# Patient Record
Sex: MALE | Race: BLACK OR AFRICAN AMERICAN | Employment: OTHER | URBAN - METROPOLITAN AREA
[De-identification: names, ages, dates, MRNs, and addresses within clinical notes are randomized per-mention and may not be internally consistent; named-entity substitution may affect disease eponyms.]

---

## 2019-04-03 ENCOUNTER — HOSPITAL ENCOUNTER (OUTPATIENT)
Age: 24
Setting detail: SPECIMEN
Discharge: HOME | End: 2019-04-03
Payer: MEDICARE

## 2019-04-03 LAB
HAV IGM SER IA-ACNC: NONREACTIVE
HEPATITIS B CORE IGM ANTIBODY: NONREACTIVE
HEPATITIS B SURFACE ANTIGEN: NONREACTIVE
HEPATITIS C ANTIBODY: NONREACTIVE
HIV AG/AB: NONREACTIVE
T. PALLIDUM, IGG: NONREACTIVE

## 2019-04-04 LAB
C. TRACHOMATIS DNA ,URINE: NEGATIVE
N. GONORRHOEAE DNA, URINE: NEGATIVE
SPECIMEN DESCRIPTION: NORMAL

## 2019-04-10 LAB
SEND OUT REPORT: NORMAL
TEST NAME: NORMAL

## 2019-11-19 ENCOUNTER — HOSPITAL ENCOUNTER (EMERGENCY)
Age: 24
Discharge: HOME | End: 2019-11-19
Payer: MEDICARE

## 2019-11-19 VITALS
SYSTOLIC BLOOD PRESSURE: 141 MMHG | TEMPERATURE: 98.3 F | DIASTOLIC BLOOD PRESSURE: 92 MMHG | RESPIRATION RATE: 18 BRPM | OXYGEN SATURATION: 100 % | HEART RATE: 89 BPM

## 2019-11-19 DIAGNOSIS — Z20.2 STD EXPOSURE: Primary | ICD-10-CM

## 2019-11-19 LAB
BILIRUBIN URINE: NEGATIVE
BLOOD, URINE: NEGATIVE
CHARACTER, URINE: CLEAR
CHLAMYDIA TRACHOMATIS BY RT-PCR: DETECTED
COLOR: YELLOW
CT/NG SOURCE: ABNORMAL
GLUCOSE URINE: NEGATIVE MG/DL
KETONES, URINE: ABNORMAL
LEUKOCYTE ESTERASE, URINE: NEGATIVE
NEISSERIA GONORRHOEAE BY RT-PCR: NOT DETECTED
NITRITE, URINE: NEGATIVE
PH UA: 7 (ref 5–9)
PROTEIN UA: NEGATIVE
SPECIFIC GRAVITY, URINE: 1.03 (ref 1–1.03)
TRICHOMONAS, URINE, MALE: NORMAL
UROBILINOGEN, URINE: 1 EU/DL (ref 0–1)

## 2019-11-19 PROCEDURE — 96372 THER/PROPH/DIAG INJ SC/IM: CPT

## 2019-11-19 PROCEDURE — 81003 URINALYSIS AUTO W/O SCOPE: CPT

## 2019-11-19 PROCEDURE — 99283 EMERGENCY DEPT VISIT LOW MDM: CPT

## 2019-11-19 PROCEDURE — 87591 N.GONORRHOEAE DNA AMP PROB: CPT

## 2019-11-19 PROCEDURE — 2500000003 HC RX 250 WO HCPCS: Performed by: PHYSICIAN ASSISTANT

## 2019-11-19 PROCEDURE — 6360000002 HC RX W HCPCS: Performed by: PHYSICIAN ASSISTANT

## 2019-11-19 PROCEDURE — 6370000000 HC RX 637 (ALT 250 FOR IP): Performed by: PHYSICIAN ASSISTANT

## 2019-11-19 PROCEDURE — 87491 CHLMYD TRACH DNA AMP PROBE: CPT

## 2019-11-19 PROCEDURE — 87210 SMEAR WET MOUNT SALINE/INK: CPT

## 2019-11-19 RX ORDER — METRONIDAZOLE 500 MG/1
2000 TABLET ORAL ONCE
Status: COMPLETED | OUTPATIENT
Start: 2019-11-19 | End: 2019-11-19

## 2019-11-19 RX ORDER — AZITHROMYCIN 250 MG/1
1000 TABLET, FILM COATED ORAL ONCE
Status: COMPLETED | OUTPATIENT
Start: 2019-11-19 | End: 2019-11-19

## 2019-11-19 RX ADMIN — LIDOCAINE HYDROCHLORIDE 250 MG: 10 INJECTION, SOLUTION EPIDURAL; INFILTRATION; INTRACAUDAL; PERINEURAL at 15:18

## 2019-11-19 RX ADMIN — METRONIDAZOLE 2000 MG: 500 TABLET ORAL at 15:18

## 2019-11-19 RX ADMIN — AZITHROMYCIN 1000 MG: 250 TABLET, FILM COATED ORAL at 15:18

## 2019-11-19 ASSESSMENT — ENCOUNTER SYMPTOMS
ABDOMINAL PAIN: 0
SHORTNESS OF BREATH: 0
NAUSEA: 0
COLOR CHANGE: 0
VOMITING: 0
BACK PAIN: 0

## 2021-07-31 ENCOUNTER — HOSPITAL ENCOUNTER (EMERGENCY)
Age: 26
Discharge: HOME | End: 2021-07-31
Attending: EMERGENCY MEDICINE
Payer: MEDICARE

## 2021-07-31 ENCOUNTER — APPOINTMENT (OUTPATIENT)
Dept: ULTRASOUND IMAGING | Age: 26
End: 2021-07-31
Payer: MEDICARE

## 2021-07-31 VITALS
DIASTOLIC BLOOD PRESSURE: 85 MMHG | OXYGEN SATURATION: 96 % | SYSTOLIC BLOOD PRESSURE: 136 MMHG | HEART RATE: 82 BPM | RESPIRATION RATE: 16 BRPM | TEMPERATURE: 98 F

## 2021-07-31 DIAGNOSIS — N45.1 EPIDIDYMITIS: Primary | ICD-10-CM

## 2021-07-31 DIAGNOSIS — N39.0 URINARY TRACT INFECTION IN MALE: ICD-10-CM

## 2021-07-31 LAB
ANION GAP SERPL CALCULATED.3IONS-SCNC: 12 MEQ/L (ref 8–16)
BACTERIA: ABNORMAL /HPF
BASOPHILS # BLD: 0.7 %
BASOPHILS ABSOLUTE: 0.1 THOU/MM3 (ref 0–0.1)
BILIRUBIN URINE: NEGATIVE
BLOOD, URINE: ABNORMAL
BUN BLDV-MCNC: 14 MG/DL (ref 7–22)
CALCIUM SERPL-MCNC: 9.4 MG/DL (ref 8.5–10.5)
CASTS 2: ABNORMAL /LPF
CASTS UA: ABNORMAL /LPF
CHARACTER, URINE: CLEAR
CHLAMYDIA TRACHOMATIS BY RT-PCR: DETECTED
CHLORIDE BLD-SCNC: 106 MEQ/L (ref 98–111)
CO2: 23 MEQ/L (ref 23–33)
COLOR: YELLOW
CREAT SERPL-MCNC: 0.9 MG/DL (ref 0.4–1.2)
CRYSTALS, UA: ABNORMAL
CT/NG SOURCE: ABNORMAL
EOSINOPHIL # BLD: 2.6 %
EOSINOPHILS ABSOLUTE: 0.2 THOU/MM3 (ref 0–0.4)
EPITHELIAL CELLS, UA: ABNORMAL /HPF
ERYTHROCYTE [DISTWIDTH] IN BLOOD BY AUTOMATED COUNT: 13.3 % (ref 11.5–14.5)
ERYTHROCYTE [DISTWIDTH] IN BLOOD BY AUTOMATED COUNT: 39.5 FL (ref 35–45)
GFR SERPL CREATININE-BSD FRML MDRD: > 90 ML/MIN/1.73M2
GLUCOSE BLD-MCNC: 110 MG/DL (ref 70–108)
GLUCOSE URINE: NEGATIVE MG/DL
HCT VFR BLD CALC: 43.7 % (ref 42–52)
HEMOGLOBIN: 15.5 GM/DL (ref 14–18)
IMMATURE GRANS (ABS): 0.07 THOU/MM3 (ref 0–0.07)
IMMATURE GRANULOCYTES: 0.8 %
KETONES, URINE: ABNORMAL
LEUKOCYTE ESTERASE, URINE: ABNORMAL
LYMPHOCYTES # BLD: 49.9 %
LYMPHOCYTES ABSOLUTE: 4.2 THOU/MM3 (ref 1–4.8)
MCH RBC QN AUTO: 29.4 PG (ref 26–33)
MCHC RBC AUTO-ENTMCNC: 35.5 GM/DL (ref 32.2–35.5)
MCV RBC AUTO: 82.8 FL (ref 80–94)
MISCELLANEOUS 2: ABNORMAL
MONOCYTES # BLD: 6.5 %
MONOCYTES ABSOLUTE: 0.6 THOU/MM3 (ref 0.4–1.3)
NEISSERIA GONORRHOEAE BY RT-PCR: NOT DETECTED
NITRITE, URINE: NEGATIVE
NUCLEATED RED BLOOD CELLS: 0 /100 WBC
OSMOLALITY CALCULATION: 282.4 MOSMOL/KG (ref 275–300)
PH UA: 6 (ref 5–9)
PLATELET # BLD: 317 THOU/MM3 (ref 130–400)
PMV BLD AUTO: 8.7 FL (ref 9.4–12.4)
POTASSIUM SERPL-SCNC: 4.1 MEQ/L (ref 3.5–5.2)
PROTEIN UA: NEGATIVE
RBC # BLD: 5.28 MILL/MM3 (ref 4.7–6.1)
RBC URINE: ABNORMAL /HPF
RENAL EPITHELIAL, UA: ABNORMAL
SEG NEUTROPHILS: 39.5 %
SEGMENTED NEUTROPHILS ABSOLUTE COUNT: 3.4 THOU/MM3 (ref 1.8–7.7)
SODIUM BLD-SCNC: 141 MEQ/L (ref 135–145)
SPECIFIC GRAVITY, URINE: 1.03 (ref 1–1.03)
UROBILINOGEN, URINE: 2 EU/DL (ref 0–1)
WBC # BLD: 8.5 THOU/MM3 (ref 4.8–10.8)
WBC UA: > 100 /HPF
YEAST: ABNORMAL

## 2021-07-31 PROCEDURE — 81001 URINALYSIS AUTO W/SCOPE: CPT

## 2021-07-31 PROCEDURE — 87591 N.GONORRHOEAE DNA AMP PROB: CPT

## 2021-07-31 PROCEDURE — 36415 COLL VENOUS BLD VENIPUNCTURE: CPT

## 2021-07-31 PROCEDURE — 6360000002 HC RX W HCPCS: Performed by: EMERGENCY MEDICINE

## 2021-07-31 PROCEDURE — 2500000003 HC RX 250 WO HCPCS

## 2021-07-31 PROCEDURE — 85025 COMPLETE CBC W/AUTO DIFF WBC: CPT

## 2021-07-31 PROCEDURE — 76870 US EXAM SCROTUM: CPT

## 2021-07-31 PROCEDURE — 96372 THER/PROPH/DIAG INJ SC/IM: CPT

## 2021-07-31 PROCEDURE — 87491 CHLMYD TRACH DNA AMP PROBE: CPT

## 2021-07-31 PROCEDURE — 80048 BASIC METABOLIC PNL TOTAL CA: CPT

## 2021-07-31 PROCEDURE — 87086 URINE CULTURE/COLONY COUNT: CPT

## 2021-07-31 PROCEDURE — 6370000000 HC RX 637 (ALT 250 FOR IP): Performed by: EMERGENCY MEDICINE

## 2021-07-31 PROCEDURE — 99283 EMERGENCY DEPT VISIT LOW MDM: CPT

## 2021-07-31 RX ORDER — DOXYCYCLINE HYCLATE 100 MG/1
100 CAPSULE ORAL 2 TIMES DAILY
Qty: 20 CAPSULE | Refills: 0 | Status: SHIPPED | OUTPATIENT
Start: 2021-07-31 | End: 2021-08-10

## 2021-07-31 RX ORDER — AZITHROMYCIN 250 MG/1
1000 TABLET, FILM COATED ORAL ONCE
Status: COMPLETED | OUTPATIENT
Start: 2021-07-31 | End: 2021-07-31

## 2021-07-31 RX ORDER — LIDOCAINE HYDROCHLORIDE 10 MG/ML
INJECTION, SOLUTION EPIDURAL; INFILTRATION; INTRACAUDAL; PERINEURAL
Status: COMPLETED
Start: 2021-07-31 | End: 2021-07-31

## 2021-07-31 RX ORDER — CEFTRIAXONE 500 MG/1
500 INJECTION, POWDER, FOR SOLUTION INTRAMUSCULAR; INTRAVENOUS ONCE
Status: COMPLETED | OUTPATIENT
Start: 2021-07-31 | End: 2021-07-31

## 2021-07-31 RX ORDER — CIPROFLOXACIN 500 MG/1
500 TABLET, FILM COATED ORAL 2 TIMES DAILY
Qty: 20 TABLET | Refills: 0 | Status: SHIPPED | OUTPATIENT
Start: 2021-07-31 | End: 2021-08-10

## 2021-07-31 RX ADMIN — LIDOCAINE HYDROCHLORIDE 1 ML: 10 INJECTION, SOLUTION EPIDURAL; INFILTRATION; INTRACAUDAL; PERINEURAL at 02:51

## 2021-07-31 RX ADMIN — CEFTRIAXONE SODIUM 500 MG: 500 INJECTION, POWDER, FOR SOLUTION INTRAMUSCULAR; INTRAVENOUS at 02:49

## 2021-07-31 RX ADMIN — AZITHROMYCIN MONOHYDRATE 1000 MG: 250 TABLET ORAL at 02:47

## 2021-07-31 ASSESSMENT — ENCOUNTER SYMPTOMS
WHEEZING: 0
STRIDOR: 0
CHEST TIGHTNESS: 0
ABDOMINAL PAIN: 0
COUGH: 0
ABDOMINAL DISTENTION: 0
EYE DISCHARGE: 0
SHORTNESS OF BREATH: 0
EYE ITCHING: 0
DIARRHEA: 0
NAUSEA: 0
VOMITING: 0
EYE REDNESS: 0
EYE PAIN: 0
PHOTOPHOBIA: 0
BACK PAIN: 0
RHINORRHEA: 0
SORE THROAT: 0
CONSTIPATION: 0

## 2021-07-31 ASSESSMENT — PAIN DESCRIPTION - ORIENTATION: ORIENTATION: LEFT

## 2021-07-31 ASSESSMENT — PAIN DESCRIPTION - LOCATION: LOCATION: SCROTUM

## 2021-07-31 ASSESSMENT — PAIN DESCRIPTION - PAIN TYPE: TYPE: ACUTE PAIN

## 2021-07-31 ASSESSMENT — PAIN DESCRIPTION - DESCRIPTORS: DESCRIPTORS: THROBBING

## 2021-07-31 ASSESSMENT — PAIN SCALES - GENERAL: PAINLEVEL_OUTOF10: 6

## 2021-07-31 NOTE — ED PROVIDER NOTES
[address] ENCOUNTER      PATIENT NAME: Kierra Baez  MRN: [id number]  : 1995  MILLS: 2021  PROVIDER: Miguel A Martin MD      CHIEF COMPLAINT       Chief Complaint   Patient presents with    Scrotal Pain       Patient is seen and evaluated in a timely fashion. Nurses Notes are reviewed and I agree except as noted in the HPI. HISTORY OF PRESENT ILLNESS    Kierra Baez is a 22 y.o. male who presents to Emergency Department with Scrotal Pain     Patient presents to ED with left testicle pain and dysuria over last 2 days duration. No fever, no chills. Sexual partner was treated in ED recently for possible STI. Pain is at 3/10, worse on touch. History is obtained from patient. REVIEW OF SYSTEMS   Review of Systems   Constitutional: Negative for activity change, appetite change, chills, fatigue, fever and unexpected weight change. HENT: Negative for congestion, ear discharge, ear pain, hearing loss, nosebleeds, rhinorrhea and sore throat. Eyes: Negative for photophobia, pain, discharge, redness and itching. Respiratory: Negative for cough, chest tightness, shortness of breath, wheezing and stridor. Cardiovascular: Negative for chest pain, palpitations and leg swelling. Gastrointestinal: Negative for abdominal distention, abdominal pain, constipation, diarrhea, nausea and vomiting. Endocrine: Negative for cold intolerance, heat intolerance, polydipsia and polyphagia. Genitourinary: Positive for dysuria and testicular pain. Negative for flank pain, frequency and hematuria. Musculoskeletal: Negative for arthralgias, back pain, gait problem, myalgias, neck pain and neck stiffness. Skin: Negative for pallor, rash and wound. Allergic/Immunologic: Negative for environmental allergies and food allergies. Neurological: Negative for dizziness, tremors, syncope, weakness and headaches.    Psychiatric/Behavioral: Negative for agitation, behavioral problems, confusion, self-injury, sleep disturbance and suicidal ideas. PAST MEDICAL HISTORY   History reviewed. No pertinent past medical history. SURGICAL HISTORY     History reviewed. No pertinent surgical history. CURRENT MEDICATIONS       Previous Medications    No medications on file       ALLERGIES     Patient has no known allergies. FAMILY HISTORY     has no family status information on file. family history is not on file. SOCIAL HISTORY          PHYSICAL EXAM      oral temperature is 98 °F (36.7 °C). His blood pressure is 136/85 and his pulse is 82. His respiration is 16 and oxygen saturation is 96%. Physical Exam  Vitals and nursing note reviewed. Constitutional:       Appearance: He is well-developed. He is not diaphoretic. HENT:      Head: Normocephalic and atraumatic. Nose: Nose normal.   Eyes:      General: No scleral icterus. Right eye: No discharge. Left eye: No discharge. Conjunctiva/sclera: Conjunctivae normal.      Pupils: Pupils are equal, round, and reactive to light. Neck:      Vascular: No JVD. Trachea: No tracheal deviation. Cardiovascular:      Rate and Rhythm: Normal rate and regular rhythm. Heart sounds: Normal heart sounds. No murmur heard. No friction rub. No gallop. Pulmonary:      Effort: Pulmonary effort is normal. No respiratory distress. Breath sounds: Normal breath sounds. No stridor. No wheezing or rales. Chest:      Chest wall: No tenderness. Abdominal:      General: Bowel sounds are normal. There is no distension. Palpations: Abdomen is soft. There is no mass. Tenderness: There is no abdominal tenderness. There is no guarding or rebound. Hernia: No hernia is present. Genitourinary:     Comments: Mild tenderness behind the left testicle. Musculoskeletal:         General: No tenderness or deformity. Cervical back: Normal range of motion and neck supple. Lymphadenopathy:      Cervical: No cervical adenopathy. Skin:     General: Skin is warm and dry. Capillary Refill: Capillary refill takes less than 2 seconds. Coloration: Skin is not pale. Findings: No erythema or rash. Neurological:      Mental Status: He is alert and oriented to person, place, and time. Cranial Nerves: No cranial nerve deficit. Sensory: No sensory deficit. Motor: No abnormal muscle tone. Coordination: Coordination normal.      Deep Tendon Reflexes: Reflexes normal.   Psychiatric:         Behavior: Behavior normal.         Thought Content: Thought content normal.         Judgment: Judgment normal.         ANCILLARY TEST RESULTS   EKG:    Interpreted by me      LAB RESULTS:  Results for orders placed or performed during the hospital encounter of 07/31/21   CBC Auto Differential   Result Value Ref Range    WBC 8.5 4.8 - 10.8 thou/mm3    RBC 5.28 4.70 - 6.10 mill/mm3    Hemoglobin 15.5 14.0 - 18.0 gm/dl    Hematocrit 43.7 42.0 - 52.0 %    MCV 82.8 80.0 - 94.0 fL    MCH 29.4 26.0 - 33.0 pg    MCHC 35.5 32.2 - 35.5 gm/dl    RDW-CV 13.3 11.5 - 14.5 %    RDW-SD 39.5 35.0 - 45.0 fL    Platelets 197 121 - 773 thou/mm3    MPV 8.7 (L) 9.4 - 12.4 fL    Seg Neutrophils 39.5 %    Lymphocytes 49.9 %    Monocytes 6.5 %    Eosinophils 2.6 %    Basophils 0.7 %    Immature Granulocytes 0.8 %    Segs Absolute 3.4 1 - 7 thou/mm3    Lymphocytes Absolute 4.2 1.0 - 4.8 thou/mm3    Monocytes Absolute 0.6 0.4 - 1.3 thou/mm3    Eosinophils Absolute 0.2 0.0 - 0.4 thou/mm3    Basophils Absolute 0.1 0.0 - 0.1 thou/mm3    Immature Grans (Abs) 0.07 0.00 - 0.07 thou/mm3    nRBC 0 /100 wbc   Basic Metabolic Panel   Result Value Ref Range    Sodium 141 135 - 145 meq/L    Potassium 4.1 3.5 - 5.2 meq/L    Chloride 106 98 - 111 meq/L    CO2 23 23 - 33 meq/L    Glucose 110 (H) 70 - 108 mg/dL    BUN 14 7 - 22 mg/dL    CREATININE 0.9 0.4 - 1.2 mg/dL    Calcium 9.4 8.5 - 10.5 mg/dL   Anion Gap Result Value Ref Range    Anion Gap 12.0 8.0 - 16.0 meq/L   Glomerular Filtration Rate, Estimated   Result Value Ref Range    Est, Glom Filt Rate >90 ml/min/1.73m2   Osmolality   Result Value Ref Range    Osmolality Calc 282.4 275.0 - 300.0 mOsmol/kg   Urine with Reflexed Micro   Result Value Ref Range    Glucose, Ur NEGATIVE NEGATIVE mg/dl    Bilirubin Urine NEGATIVE NEGATIVE    Ketones, Urine TRACE (A) NEGATIVE    Specific Gravity, Urine 1.027 1.002 - 1.030    Blood, Urine TRACE (A) NEGATIVE    pH, UA 6.0 5.0 - 9.0    Protein, UA NEGATIVE NEGATIVE    Urobilinogen, Urine 2.0 (A) 0.0 - 1.0 eu/dl    Nitrite, Urine NEGATIVE NEGATIVE    Leukocyte Esterase, Urine MODERATE (A) NEGATIVE    Color, UA YELLOW STRAW-YELLOW    Character, Urine CLEAR CLEAR-SL CLOUD    RBC, UA 3-5 0-2/hpf /hpf    WBC, UA > 100 0-4/hpf /hpf    Epithelial Cells, UA NONE SEEN 3-5/hpf /hpf    Bacteria, UA NONE SEEN FEW/NONE SEEN /hpf    Casts UA >15 HYALINE NONE SEEN /lpf    Crystals, UA NONE SEEN NONE SEEN    Renal Epithelial, UA NONE SEEN NONE SEEN    Yeast, UA NONE SEEN NONE SEEN    CASTS 2 NONE SEEN NONE SEEN /lpf    MISCELLANEOUS 2 NONE SEEN        RADIOLOGY REPORTS  US SCROTUM AND TESTICLES   Final Result   Impression:   Left epididymitis. This document has been electronically signed by: Abimbola Aguilar MD on    07/31/2021 04:20 AM          MEDICAL [address] [place] [address] RATIONALES     Differential diagnsis: UTI, STI, epididymitis, orchitis    Actions: Labs, UA, scrotal/testicle ultrasound      ED Vitals:  Vitals:    07/31/21 0022 07/31/21 0245   BP: (!) 150/93 136/85   Pulse: 91 82   Resp: 18 16   Temp: 98 °F (36.7 °C)    TempSrc: Oral    SpO2: 97% 96%       ED work-ups suggest patient UTI and epididymitis. Patient is treated for STI and UTI.     Medications   cefTRIAXone (ROCEPHIN) injection 500 mg (500 mg Intramuscular Given 7/31/21 0249)   azithromycin (ZITHROMAX) tablet 1,000 mg (1,000 mg Oral Given 7/31/21 0247)   lidocaine PF 1 % injection (1 mL  Given 7/31/21 0251)     Discharged with PCP follow-up in 1 week. Of note patient's sexual partner is already treated in ED. CRITICAL CARE   None    CONSULTS   None    PROCEDURES   None    FINAL IMPRESSION AND DISPOSITION      1. Epididymitis    2.  Urinary tract infection in male        Home    PATIENT REFERRED TO:  JESSCIA Conde - CNP  [address]  [phone]    In 1 week  ED discharge follow-up      DISCHARGE MEDICATIONS:  New Prescriptions    CIPROFLOXACIN (CIPRO) 500 MG TABLET    Take 1 tablet by mouth 2 times daily for 10 days    DOXYCYCLINE HYCLATE (VIBRAMYCIN) 100 MG CAPSULE    Take 1 capsule by mouth 2 times daily for 10 days       (Please note that portions of this note were completed with a voice recognition program.  Efforts were made to edit the dictations but occasionally words aremis-transcribed.)    MD Amol Ramirez MD  07/31/21 5730

## 2021-07-31 NOTE — ED NOTES
Pt comes in through ED lobby. He states 2 days ago he began having sudden left testicle pain without injury. He states \"It hurts to touch. Anytime I'm in the car and I hit a bump it hurts\". He denies any swelling to the area.       Mary Ortiz RN  07/31/21 4948

## 2021-07-31 NOTE — ED NOTES
Pt awake, on his phone. RR even and unlabored. Pt medicated per MAR.       Kevin Pinon, RN  07/31/21 3164

## 2021-08-01 LAB
ORGANISM: ABNORMAL
URINE CULTURE REFLEX: ABNORMAL

## 2021-08-03 ENCOUNTER — TELEPHONE (OUTPATIENT)
Dept: PHARMACY | Age: 26
End: 2021-08-03

## 2021-08-03 NOTE — TELEPHONE ENCOUNTER
Patient confirms understanding regarding results and instructions. Instructed to finish the antibiotics he was sent home with. Denies any questions.     Damari Payan, PharmD 8/3/2021  5:14 PM

## 2021-08-03 NOTE — TELEPHONE ENCOUNTER
Pharmacy Note  ED Culture Follow-up    Halina Bob is a 22 y.o. male. Allergies: Patient has no known allergies. Labs:  Lab Results   Component Value Date    BUN 14 07/31/2021    CREATININE 0.9 07/31/2021    WBC 8.5 07/31/2021     CrCl cannot be calculated (Unknown ideal weight.). Current antimicrobials:   · Azithromycin and ceftriaxone in ED  · Discharged on ciprofloxacin and doxycycline    ASSESSMENT:  Micro results:   Genital culture: positive for Chlamydia     PLAN:  Need for intervention: Inform patient of positive result  Discussed with: CODY Palacio  Chosen treatment:    · Instruct patient to contact sexual partner(s) and inform them of result. Instruct to abstain from sexual activity x7 days and to follow-up with the [facility]. Patient response:   · Call attempt #1, did not reach patient. Listed phone number is patient's mother who stated she would pass along message to call me back at [phone]. Called/sent in prescription to: Not applicable    Please call with any questions.  VALENTINA Mcmullen [facility], PharmD 3:24 PM 8/3/2021

## 2021-09-21 ENCOUNTER — HOSPITAL ENCOUNTER (EMERGENCY)
Age: 26
Discharge: HOME | End: 2021-09-21
Payer: MEDICARE

## 2021-09-21 VITALS
DIASTOLIC BLOOD PRESSURE: 88 MMHG | TEMPERATURE: 98.4 F | OXYGEN SATURATION: 97 % | RESPIRATION RATE: 18 BRPM | SYSTOLIC BLOOD PRESSURE: 141 MMHG | WEIGHT: 230 LBS | HEIGHT: 66 IN | HEART RATE: 81 BPM | BODY MASS INDEX: 36.96 KG/M2

## 2021-09-21 DIAGNOSIS — J02.9 VIRAL PHARYNGITIS: Primary | ICD-10-CM

## 2021-09-21 LAB
GROUP A STREP CULTURE, REFLEX: NEGATIVE
REFLEX THROAT C + S: NORMAL

## 2021-09-21 PROCEDURE — 99282 EMERGENCY DEPT VISIT SF MDM: CPT

## 2021-09-21 PROCEDURE — U0003 INFECTIOUS AGENT DETECTION BY NUCLEIC ACID (DNA OR RNA); SEVERE ACUTE RESPIRATORY SYNDROME CORONAVIRUS 2 (SARS-COV-2) (CORONAVIRUS DISEASE [COVID-19]), AMPLIFIED PROBE TECHNIQUE, MAKING USE OF HIGH THROUGHPUT TECHNOLOGIES AS DESCRIBED BY CMS-2020-01-R: HCPCS

## 2021-09-21 PROCEDURE — 87880 STREP A ASSAY W/OPTIC: CPT

## 2021-09-21 PROCEDURE — 87070 CULTURE OTHR SPECIMN AEROBIC: CPT

## 2021-09-21 PROCEDURE — U0005 INFEC AGEN DETEC AMPLI PROBE: HCPCS

## 2021-09-21 ASSESSMENT — ENCOUNTER SYMPTOMS
NAUSEA: 0
FACIAL SWELLING: 0
SORE THROAT: 1
SHORTNESS OF BREATH: 0
RHINORRHEA: 0
PHOTOPHOBIA: 0
VOICE CHANGE: 0
VOMITING: 0
ABDOMINAL PAIN: 0
TROUBLE SWALLOWING: 0
COUGH: 0

## 2021-09-21 NOTE — ED NOTES
PT presents to the ed with c/o wanting a covid test and sore throat. PT states that he has had sore throat for one day.      Aimee Angelo LPN  29/72/91 4818       Aimee Angelo LPN  33/97/64 0718

## 2021-09-21 NOTE — ED PROVIDER NOTES
[facility] EMERGENCY DEPT      CHIEF COMPLAINT       Chief Complaint   Patient presents with    Covid Testing    Pharyngitis       Nurses Notes reviewed and I agree except as noted in the HPI. HISTORY OF PRESENT ILLNESS    Riky Alex is a 22 y.o. male who presents for Covid testing. Last week patient went to [facility]. Since then he has had a sore throat but no other symptoms. Today he mentioned it to his boss who advised him to come to the ER for Covid testing. Patient denies fever, cough, chest pain, dyspnea, or other complaints. REVIEW OF SYSTEMS     Review of Systems   Constitutional: Negative for chills, fatigue and fever. HENT: Positive for sore throat. Negative for congestion, drooling, ear pain, facial swelling, rhinorrhea, trouble swallowing and voice change. Eyes: Negative for photophobia. Respiratory: Negative for cough and shortness of breath. Cardiovascular: Negative for chest pain. Gastrointestinal: Negative for abdominal pain, nausea and vomiting. Endocrine: Negative for polyuria. Genitourinary: Negative for decreased urine volume, difficulty urinating and dysuria. Musculoskeletal: Negative for myalgias and neck stiffness. Skin: Negative for rash. Neurological: Negative for dizziness, weakness and headaches. Hematological: Negative for adenopathy. Psychiatric/Behavioral: Negative for confusion and sleep disturbance. PAST MEDICAL HISTORY    has no past medical history on file. SURGICAL HISTORY      has no past surgical history on file. CURRENT MEDICATIONS       Previous Medications    No medications on file       ALLERGIES     is allergic to medical provider ez flu shot [influenza vac split quad]. FAMILY HISTORY     has no family status information on file. family history is not on file. SOCIAL HISTORY        PHYSICAL EXAM     INITIAL VITALS:  height is 5' 6\" (1.676 m) and weight is 230 lb (104.3 kg).  His oral temperature is 98.4 °F (36.9 °C). His blood pressure is 141/88 (abnormal) and his pulse is 81. His respiration is 18 and oxygen saturation is 97%. Physical Exam  Constitutional:       Appearance: Normal appearance. He is well-developed. He is not ill-appearing. HENT:      Head: Normocephalic and atraumatic. Right Ear: Hearing normal.      Left Ear: Hearing normal.      Nose: Nose normal. No rhinorrhea. Mouth/Throat:      Lips: Pink. Mouth: Mucous membranes are moist.      Pharynx: Oropharynx is clear. Uvula midline. No uvula swelling. Tonsils: No tonsillar exudate or tonsillar abscesses. Comments: Speech is clear. Patient managing own secretions  Eyes:      General: Lids are normal.      Extraocular Movements: Extraocular movements intact. Conjunctiva/sclera: Conjunctivae normal.   Neck:      Trachea: Trachea normal.   Cardiovascular:      Heart sounds: No murmur heard. Pulmonary:      Effort: Pulmonary effort is normal.      Breath sounds: Normal air entry. Abdominal:      General: There is no distension. Musculoskeletal:      Cervical back: Normal range of motion and neck supple. Comments: Well perfused; movement normal as observed. Skin:     General: Skin is warm and dry. Findings: No rash. Neurological:      General: No focal deficit present. Mental Status: He is alert. Mental status is at baseline. Motor: Motor function is intact.       Gait: Gait normal.   Psychiatric:         Mood and Affect: Mood normal.         Speech: Speech normal.         Behavior: Behavior normal.         DIFFERENTIAL DIAGNOSIS:   Including but not limited to: Viral pharyngitis, seasonal allergies, common cold, less likely but considered Covid    DIAGNOSTIC RESULTS     EKG: All E[facility]'s are [facility] who either signs or Co-signs this chart in the absence of a cardiologist.  None    RADIOLOGY: non-plain film images(s) such as CT,Ultrasound and MRI are Quyen Palomino, [place]  09/21/21 1014

## 2021-09-22 ENCOUNTER — CARE COORDINATION (OUTPATIENT)
Dept: CARE COORDINATION | Age: 26
End: 2021-09-22

## 2021-09-22 LAB
SARS-COV-2: NOT DETECTED
SOURCE: NORMAL

## 2021-09-23 LAB — THROAT/NOSE CULTURE: NORMAL

## 2021-09-27 ENCOUNTER — HOSPITAL ENCOUNTER (EMERGENCY)
Age: 26
Discharge: HOME | End: 2021-09-27
Attending: EMERGENCY MEDICINE
Payer: MEDICARE

## 2021-09-27 VITALS
HEART RATE: 93 BPM | RESPIRATION RATE: 18 BRPM | DIASTOLIC BLOOD PRESSURE: 93 MMHG | TEMPERATURE: 98 F | OXYGEN SATURATION: 96 % | SYSTOLIC BLOOD PRESSURE: 140 MMHG

## 2021-09-27 DIAGNOSIS — Z71.89 EDUCATED ABOUT COVID-19 VIRUS INFECTION: Primary | ICD-10-CM

## 2021-09-27 PROCEDURE — 99282 EMERGENCY DEPT VISIT SF MDM: CPT

## 2021-09-27 PROCEDURE — U0005 INFEC AGEN DETEC AMPLI PROBE: HCPCS

## 2021-09-27 PROCEDURE — U0003 INFECTIOUS AGENT DETECTION BY NUCLEIC ACID (DNA OR RNA); SEVERE ACUTE RESPIRATORY SYNDROME CORONAVIRUS 2 (SARS-COV-2) (CORONAVIRUS DISEASE [COVID-19]), AMPLIFIED PROBE TECHNIQUE, MAKING USE OF HIGH THROUGHPUT TECHNOLOGIES AS DESCRIBED BY CMS-2020-01-R: HCPCS

## 2021-09-28 ENCOUNTER — CARE COORDINATION (OUTPATIENT)
Dept: CARE COORDINATION | Age: 26
End: 2021-09-28

## 2021-09-28 NOTE — CARE COORDINATION
Attempted to reach [phone] today for ED f/u COVID at risk outreach. No answer. VM not set up.

## 2021-09-28 NOTE — ED PROVIDER NOTES
[facility] [address] [address] EMERGENCY DEPT    EMERGENCY MEDICINE     Pt Name: Jolene Navarrete  MRN: [id number]  [place] 1995  Date of evaluation: 9/27/2021  Provider: Jerosn Stover DO, [address]       Chief Complaint   Patient presents with    Covid Testing       HISTORY OF PRESENT ILLNESS    Jolene Navarrete is a pleasant 22 y.o. male who presents to the emergency department from home   Was to be tested for Covid  Has no symptoms  Has had some exposures approximately week ago. Negative test at that time      Triage notes and Nursing notes were reviewed by myself. Any discrepancies are addressed above. PAST MEDICAL HISTORY   No past medical history on file. SURGICAL HISTORY     No past surgical history on file. CURRENT MEDICATIONS       Previous Medications    No medications on file       ALLERGIES     Medical provider ez flu shot [influenza vac split quad]    FAMILY HISTORY     No family history on file. SOCIAL HISTORY       Social History     Socioeconomic History    Marital status: Single     Spouse name: Not on file    Number of children: Not on file    Years of education: Not on file    Highest education level: Not on file   Occupational History    Not on file   Tobacco Use    Smoking status: Not on file   Substance and Sexual Activity    Alcohol use: Not on file    Drug use: Not on file    Sexual activity: Not on file   Other Topics Concern    Not on file   Social History Narrative    Not on file     Social Determinants of Health     Financial Resource Strain:     Difficulty of Paying Living Expenses:    Food Insecurity:     Worried About Running Out of Food in the Last Year:     [address] in the Last Year:    Transportation Needs:     Lack of Transportation (Medical):      Lack of Transportation (Non-Medical):    Physical Activity:     Days of Exercise per Week:     Minutes of Exercise per Session:    Stress:     Feeling of Stress :    Social Connections:     Frequency of Communication with Friends and Family:     Frequency of Social Gatherings with Friends and Family:     Attends [employer] Services:     Active Member of Clubs or Organizations:     Attends Club or Organization Meetings:     Marital Status:    Intimate Partner Violence:     Fear of Current or Ex-Partner:     Emotionally Abused:     Physically Abused:     Sexually Abused:        REVIEW OF SYSTEMS     Review of Systems   Constitutional: Negative for chills and fever. Except as noted above the remainder of the review of systems was reviewed and is. PHYSICAL EXAM    (up to 7 for level 4, 8 or more for level 5)     ED Triage Vitals [09/27/21 2232]   BP Temp Temp src Pulse Resp SpO2 Height Weight   (!) 140/93 98 °F (36.7 °C) -- 93 18 96 % -- --       Physical Exam  Vitals and nursing note reviewed. Constitutional:       Appearance: He is well-developed. HENT:      Head: Normocephalic. Eyes:      Conjunctiva/sclera: Conjunctivae normal.      Pupils: Pupils are equal, round, and reactive to light. Neck:      Trachea: No tracheal deviation. Pulmonary:      Effort: Pulmonary effort is normal.      Breath sounds: Normal breath sounds. Musculoskeletal:         General: Normal range of motion. Cervical back: Neck supple. Skin:     General: Skin is warm and dry. Neurological:      Mental Status: He is alert and oriented to person, place, and time. Cranial Nerves: No cranial nerve deficit. DIAGNOSTIC RESULTS     EKG:(none if blank)  All [facility]'s [facility] who either signs or Co-signs this chart in the absence of a cardiologist.        RADIOLOGY: (none if blank)   Interpretation per the Radiologistbelow, if available at the time of this note:    No orders to display       LABS:  Labs Reviewed   COVID-19       All other labs were within normal range or not returned as of this dictation.   Please note, any cultures that may have been sent were not resulted at the time of this patient visit. EMERGENCY DEPARTMENT COURSE andMedical Decision Making:     MDM/   Asymptomatic  Covid signed out pending        Strict returnprecautions and follow up instructions were discussed with the patient with which the patient agrees        ED Medications administered this visit:  Medications - No data to display      Procedures: (None if blank)       CLINICAL       1.  Educated about COVID-19 virus infection          DISPOSITION/PLAN   DISPOSITION Decision To Discharge 09/27/2021 11:29:30 PM      PATIENT REFERRED TO:  JESSICA Gonzalez  CNP  [address]  [phone]    In 3 days        DISCHARGE MEDICATIONS:  New Prescriptions    No medications on file              (Please note that portions of this note were completed with a voice recognition program.  Efforts were made to edit the dictations but occasionallywords are mis-transcribed.)      Huong Han DO,DANIELA (electronically signed)  Attending Physician, Emergency Department          Woodard Riedel, DO  09/27/21 8454

## 2021-09-28 NOTE — ED TRIAGE NOTES
Patient to ED from home with complaints of needing covid testing. Patient denies symptoms. 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

## 2021-09-29 ENCOUNTER — CARE COORDINATION (OUTPATIENT)
Dept: CARE COORDINATION | Age: 26
End: 2021-09-29

## 2021-09-29 LAB
SARS-COV-2: NOT DETECTED
SOURCE: NORMAL

## 2021-09-29 NOTE — CARE COORDINATION
[phone]. No answer. VM not set up.

## 2022-01-01 ENCOUNTER — HOSPITAL ENCOUNTER (EMERGENCY)
Age: 27
Discharge: HOME | End: 2022-01-01
Payer: MEDICARE

## 2022-01-01 ENCOUNTER — APPOINTMENT (OUTPATIENT)
Dept: GENERAL RADIOLOGY | Age: 27
End: 2022-01-01
Payer: MEDICARE

## 2022-01-01 VITALS
RESPIRATION RATE: 16 BRPM | HEART RATE: 102 BPM | SYSTOLIC BLOOD PRESSURE: 141 MMHG | DIASTOLIC BLOOD PRESSURE: 85 MMHG | TEMPERATURE: 99.2 F | OXYGEN SATURATION: 97 %

## 2022-01-01 DIAGNOSIS — U07.1 COVID-19: Primary | ICD-10-CM

## 2022-01-01 LAB
FLU A ANTIGEN: NEGATIVE
FLU B ANTIGEN: NEGATIVE
SARS-COV-2, NAAT: DETECTED

## 2022-01-01 PROCEDURE — 99282 EMERGENCY DEPT VISIT SF MDM: CPT

## 2022-01-01 PROCEDURE — 87635 SARS-COV-2 COVID-19 AMP PRB: CPT

## 2022-01-01 PROCEDURE — 6370000000 HC RX 637 (ALT 250 FOR IP): Performed by: PHYSICIAN ASSISTANT

## 2022-01-01 PROCEDURE — 87804 INFLUENZA ASSAY W/OPTIC: CPT

## 2022-01-01 PROCEDURE — 71045 X-RAY EXAM CHEST 1 VIEW: CPT

## 2022-01-01 RX ORDER — GUAIFENESIN 100 MG/5ML
200 SOLUTION ORAL ONCE
Status: COMPLETED | OUTPATIENT
Start: 2022-01-01 | End: 2022-01-01

## 2022-01-01 RX ORDER — MELATONIN
2000 DAILY
Qty: 28 TABLET | Refills: 0 | Status: SHIPPED | OUTPATIENT
Start: 2022-01-01 | End: 2022-06-13

## 2022-01-01 RX ORDER — MULTIVIT-MIN/IRON/FOLIC ACID/K 18-600-40
500 CAPSULE ORAL 2 TIMES DAILY
Qty: 28 CAPSULE | Refills: 0 | Status: SHIPPED | OUTPATIENT
Start: 2022-01-01 | End: 2022-06-13

## 2022-01-01 RX ORDER — GUAIFENESIN 100 MG/5ML
200 SOLUTION ORAL EVERY 4 HOURS PRN
Qty: 400 ML | Refills: 0 | Status: SHIPPED | OUTPATIENT
Start: 2022-01-01 | End: 2022-06-13

## 2022-01-01 RX ADMIN — GUAIFENESIN 200 MG: 200 SOLUTION ORAL at 12:34

## 2022-01-01 ASSESSMENT — ENCOUNTER SYMPTOMS: COUGH: 1

## 2022-01-01 NOTE — ED NOTES
Patient ambulated in [place]. Maintained oxygen saturation of 97% on room air.                Renita Rosales RN  01/01/22 1195

## 2022-01-01 NOTE — Clinical Note
Aurelia Leblanc was seen and treated in our emergency department on 1/1/2022. COVID19 virus is suspected. Per the CDC guidelines we recommend home isolation until the following conditions are all met:    1. At least 10 days have passed since symptoms first appeared and  2. At least 24 hours have passed since last fever without the use of fever-reducing medications and  3. Symptoms (e.g., cough, shortness of breath) have improved    If you have any questions or concerns, please don't hesitate to call.     He may return to work/school on 01/11/2022        Vanna Romeo PA-C

## 2022-01-01 NOTE — ED NOTES
Patient presents to ED with c/o cough and headache x2 days. Denies any pain at this time but states that he when he gets to coughing it makes his head hurt. Denies any fever, chest pain or shortness of breath.       Estrella Mcburney, RN  01/01/22 1138

## 2022-01-01 NOTE — Clinical Note
Catrachita Klein was seen and treated in our emergency department on 1/1/2022. COVID19 virus is suspected. Per the CDC guidelines we recommend home isolation until the following conditions are all met:    1. At least 10 days have passed since symptoms first appeared and  2. At least 24 hours have passed since last fever without the use of fever-reducing medications and  3. Symptoms (e.g., cough, shortness of breath) have improved    If you have any questions or concerns, please don't hesitate to call.     He may return to work/school on 01/11/2022        Vanessa Hooper PA-C

## 2022-01-01 NOTE — ED PROVIDER NOTES
[facility]  eMERGENCY dEPARTMENT eNCOUnter          CHIEF COMPLAINT       Chief Complaint   Patient presents with    Cough    Headache       Nurses Notes reviewed and I agree except as noted inthe HPI. HISTORY OF PRESENT ILLNESS    Negrito Martinez is a 32 y.o. male who presents to the Emergency Department for the evaluation of headache and cough. Symptoms began yesterday evening and are associated with positional lightheadedness. Headache is present only with cough and he notes some associated chills as well. No known sick contacts prior to symptom onset. He has not received Covid or influenza vaccination. No history of pulmonary disease and he is not a smoker. He took over-the-counter cough medicine with some improvement in his symptoms. He denies any associated sore throat, chest pain, shortness of breath, vomiting, diarrhea. No current headache. The HPI was provided by the patient. REVIEW OF SYSTEMS     Review of Systems   Constitutional: Positive for chills. Respiratory: Positive for cough. Neurological: Positive for light-headedness and headaches. All other systems reviewed and are negative. PAST MEDICAL HISTORY    has no past medical history on file. SURGICAL HISTORY      has no past surgical history on file. CURRENT MEDICATIONS       Discharge Medication List as of 1/1/2022  2:15 PM          ALLERGIES     is allergic to medical provider ez flu shot [influenza vac split quad]. FAMILY HISTORY     has no family status information on file. family history is not on file. SOCIAL HISTORY          PHYSICAL EXAM     INITIAL VITALS:  temperature is 99.2 °F (37.3 °C). His blood pressure is 141/85 (abnormal) and his pulse is 102. His respiration is 16 and oxygen saturation is 97%. Physical Exam  Vitals and nursing note reviewed. Constitutional:       Appearance: Normal appearance. HENT:      Head: Normocephalic and atraumatic. Mouth/Throat:      Pharynx: Oropharynx is clear. Eyes:      Conjunctiva/sclera: Conjunctivae normal.   Cardiovascular:      Rate and Rhythm: Normal rate and regular rhythm. Heart sounds: Normal heart sounds. No murmur heard. Pulmonary:      Effort: Pulmonary effort is normal. No respiratory distress. Breath sounds: Normal breath sounds. No wheezing or rhonchi. Abdominal:      Palpations: Abdomen is soft. Tenderness: There is no abdominal tenderness. There is no guarding or rebound. Musculoskeletal:      Cervical back: Normal range of motion. Skin:     General: Skin is warm and dry. Neurological:      General: No focal deficit present. Mental Status: He is alert. Psychiatric:         Mood and Affect: Mood normal.         DIFFERENTIAL DIAGNOSIS:   Differential diagnoses are discussed    DIAGNOSTIC RESULTS     EKG: All EKG's are interpreted by the Emergency Department Physician who either signs or Co-signsthis chart in the absence of a cardiologist.        RADIOLOGY: non-plain film images(s) such as CT, Ultrasound and MRI are read by the radiologist.    XR CHEST 1 VIEW   Final Result   Normal chest.               **This report has been created using voice recognition software. It may contain minor errors which are inherent in voice recognition technology. **      Final report electronically signed by Dr. Kaykay Trujillo on 1/1/2022 12:41 PM          LABS:      Labs Reviewed   COVID-19, RAPID - Abnormal; Notable for the following components:       Result Value    SARS-CoV-2, NAAT DETECTED (*)     All other components within normal limits   RAPID INFLUENZA A/B ANTIGENS       EMERGENCY DEPARTMENT COURSE:   Vitals:    Vitals:    01/01/22 1128   BP: (!) 141/85   Pulse: 102   Resp: 16   Temp: 99.2 °F (37.3 °C)   SpO2: 97%      12:19 PM EST: The patient was seen and evaluated. Patient presents for complaints of flulike symptoms that began yesterday.  He has not received Covid or influenza vaccine and does not have any high risk factors aside from BMI. He arrives with borderline hypertension/tachycardia pulse ox 97% on room air and maintains in the high 90s on ambulation. He had positive Covid test, negative influenza. Chest x-ray was unremarkable. He was treated in the department with guaifenesin and remained stable during his ED course. He appears stable for discharge home and outpatient follow-up at this time. Discussed supportive treatment with the patient as well as return precautions and he was agreeable with the above plan, denying further needs. Work note provided. Current CDC guidelines for quarantine discussed.     CRITICAL CARE:   None    CONSULTS:  None    PROCEDURES:  None    FINAL IMPRESSION      1. COVID-19          DISPOSITION/PLAN   Discharge    PATIENT REFERRED TO:  JESSICA Pereira - CNP  [address] [place] [address] EMERGENCY DEPT  [address]    If symptoms worsen      DISCHARGEMEDICATIONS:  Discharge Medication List as of 1/1/2022  2:15 PM      START taking these medications    Details   Ascorbic Acid (VITAMIN C) 500 MG CAPS Take 500 mg by mouth 2 times daily, Disp-28 capsule, R-0Normal      zinc 50 MG CAPS Take 100 mg by mouth nightly, Disp-28 capsule, R-0Normal      vitamin D3 (CHOLECALCIFEROL) 25 MCG (1000 UT) TABS tablet Take 2 tablets by mouth daily, Disp-28 tablet, R-0Normal      guaiFENesin (ROBITUSSIN) 100 MG/5ML SOLN oral solution Take 10 mLs by mouth every 4 hours as needed for Cough, Disp-400 mL, R-0Normal             (Please note that portions of this note were completedwith a voice recognition program.  Efforts were made to edit the dictations but occasionally words are mis-transcribed.)       Ted Méndez PA-C  01/01/22 1938

## 2022-01-03 ENCOUNTER — CARE COORDINATION (OUTPATIENT)
Dept: CARE COORDINATION | Age: 27
End: 2022-01-03

## 2022-01-04 ENCOUNTER — CARE COORDINATION (OUTPATIENT)
Dept: CARE COORDINATION | Age: 27
End: 2022-01-04

## 2022-01-04 NOTE — CARE COORDINATION
Attempted to reach [facility] for  f/u COVID outreach. Female answered states pt not available at this time.

## 2022-06-13 ENCOUNTER — HOSPITAL ENCOUNTER (EMERGENCY)
Age: 27
Discharge: HOME | End: 2022-06-13
Attending: EMERGENCY MEDICINE
Payer: MEDICARE

## 2022-06-13 VITALS
WEIGHT: 285 LBS | BODY MASS INDEX: 46 KG/M2 | DIASTOLIC BLOOD PRESSURE: 98 MMHG | TEMPERATURE: 97.8 F | OXYGEN SATURATION: 97 % | SYSTOLIC BLOOD PRESSURE: 123 MMHG | RESPIRATION RATE: 18 BRPM | HEART RATE: 81 BPM

## 2022-06-13 DIAGNOSIS — R10.84 GENERALIZED ABDOMINAL PAIN: Primary | ICD-10-CM

## 2022-06-13 DIAGNOSIS — R11.0 NAUSEA: ICD-10-CM

## 2022-06-13 LAB
ALBUMIN SERPL-MCNC: 5 G/DL (ref 3.5–5.1)
ALP BLD-CCNC: 80 U/L (ref 38–126)
ALT SERPL-CCNC: 59 U/L (ref 11–66)
ANION GAP SERPL CALCULATED.3IONS-SCNC: 13 MEQ/L (ref 8–16)
AST SERPL-CCNC: 30 U/L (ref 5–40)
BASOPHILS # BLD: 0.8 %
BASOPHILS ABSOLUTE: 0.1 THOU/MM3 (ref 0–0.1)
BILIRUB SERPL-MCNC: 1.4 MG/DL (ref 0.3–1.2)
BILIRUBIN DIRECT: < 0.2 MG/DL (ref 0–0.3)
BILIRUBIN URINE: NEGATIVE
BLOOD, URINE: NEGATIVE
BUN BLDV-MCNC: 14 MG/DL (ref 7–22)
CALCIUM SERPL-MCNC: 9.6 MG/DL (ref 8.5–10.5)
CHARACTER, URINE: CLEAR
CHLORIDE BLD-SCNC: 105 MEQ/L (ref 98–111)
CO2: 21 MEQ/L (ref 23–33)
COLOR: YELLOW
CREAT SERPL-MCNC: 0.8 MG/DL (ref 0.4–1.2)
EOSINOPHIL # BLD: 2.9 %
EOSINOPHILS ABSOLUTE: 0.2 THOU/MM3 (ref 0–0.4)
ERYTHROCYTE [DISTWIDTH] IN BLOOD BY AUTOMATED COUNT: 12.9 % (ref 11.5–14.5)
ERYTHROCYTE [DISTWIDTH] IN BLOOD BY AUTOMATED COUNT: 37.6 FL (ref 35–45)
GFR SERPL CREATININE-BSD FRML MDRD: > 90 ML/MIN/1.73M2
GLUCOSE BLD-MCNC: 101 MG/DL (ref 70–108)
GLUCOSE URINE: NEGATIVE MG/DL
HCT VFR BLD CALC: 47.1 % (ref 42–52)
HEMOGLOBIN: 17.1 GM/DL (ref 14–18)
IMMATURE GRANS (ABS): 0.07 THOU/MM3 (ref 0–0.07)
IMMATURE GRANULOCYTES: 1 %
KETONES, URINE: NEGATIVE
LEUKOCYTE ESTERASE, URINE: NEGATIVE
LIPASE: 31.6 U/L (ref 5.6–51.3)
LYMPHOCYTES # BLD: 46.5 %
LYMPHOCYTES ABSOLUTE: 3.3 THOU/MM3 (ref 1–4.8)
MCH RBC QN AUTO: 29.4 PG (ref 26–33)
MCHC RBC AUTO-ENTMCNC: 36.3 GM/DL (ref 32.2–35.5)
MCV RBC AUTO: 81.1 FL (ref 80–94)
MONOCYTES # BLD: 8.9 %
MONOCYTES ABSOLUTE: 0.6 THOU/MM3 (ref 0.4–1.3)
NITRITE, URINE: NEGATIVE
NUCLEATED RED BLOOD CELLS: 0 /100 WBC
OSMOLALITY CALCULATION: 278.2 MOSMOL/KG (ref 275–300)
PH UA: 5 (ref 5–9)
PLATELET # BLD: 299 THOU/MM3 (ref 130–400)
PMV BLD AUTO: 8.4 FL (ref 9.4–12.4)
POTASSIUM SERPL-SCNC: 4.1 MEQ/L (ref 3.5–5.2)
PROTEIN UA: NEGATIVE
RBC # BLD: 5.81 MILL/MM3 (ref 4.7–6.1)
SEG NEUTROPHILS: 39.9 %
SEGMENTED NEUTROPHILS ABSOLUTE COUNT: 2.9 THOU/MM3 (ref 1.8–7.7)
SODIUM BLD-SCNC: 139 MEQ/L (ref 135–145)
SPECIFIC GRAVITY, URINE: 1.03 (ref 1–1.03)
TOTAL PROTEIN: 7.5 G/DL (ref 6.1–8)
UROBILINOGEN, URINE: 0.2 EU/DL (ref 0–1)
WBC # BLD: 7.2 THOU/MM3 (ref 4.8–10.8)

## 2022-06-13 PROCEDURE — 82248 BILIRUBIN DIRECT: CPT

## 2022-06-13 PROCEDURE — 83690 ASSAY OF LIPASE: CPT

## 2022-06-13 PROCEDURE — 96372 THER/PROPH/DIAG INJ SC/IM: CPT

## 2022-06-13 PROCEDURE — 96375 TX/PRO/DX INJ NEW DRUG ADDON: CPT

## 2022-06-13 PROCEDURE — 85025 COMPLETE CBC W/AUTO DIFF WBC: CPT

## 2022-06-13 PROCEDURE — 81003 URINALYSIS AUTO W/O SCOPE: CPT

## 2022-06-13 PROCEDURE — 2580000003 HC RX 258: Performed by: STUDENT IN AN ORGANIZED HEALTH CARE EDUCATION/TRAINING PROGRAM

## 2022-06-13 PROCEDURE — 99284 EMERGENCY DEPT VISIT MOD MDM: CPT

## 2022-06-13 PROCEDURE — 80053 COMPREHEN METABOLIC PANEL: CPT

## 2022-06-13 PROCEDURE — 6360000002 HC RX W HCPCS: Performed by: STUDENT IN AN ORGANIZED HEALTH CARE EDUCATION/TRAINING PROGRAM

## 2022-06-13 PROCEDURE — 6370000000 HC RX 637 (ALT 250 FOR IP): Performed by: STUDENT IN AN ORGANIZED HEALTH CARE EDUCATION/TRAINING PROGRAM

## 2022-06-13 PROCEDURE — 96374 THER/PROPH/DIAG INJ IV PUSH: CPT

## 2022-06-13 RX ORDER — DICYCLOMINE HYDROCHLORIDE 10 MG/ML
20 INJECTION INTRAMUSCULAR ONCE
Status: COMPLETED | OUTPATIENT
Start: 2022-06-13 | End: 2022-06-13

## 2022-06-13 RX ORDER — ONDANSETRON 2 MG/ML
4 INJECTION INTRAMUSCULAR; INTRAVENOUS ONCE
Status: COMPLETED | OUTPATIENT
Start: 2022-06-13 | End: 2022-06-13

## 2022-06-13 RX ORDER — 0.9 % SODIUM CHLORIDE 0.9 %
1000 INTRAVENOUS SOLUTION INTRAVENOUS ONCE
Status: COMPLETED | OUTPATIENT
Start: 2022-06-13 | End: 2022-06-13

## 2022-06-13 RX ORDER — DICYCLOMINE HYDROCHLORIDE 10 MG/1
10 CAPSULE ORAL
Qty: 28 CAPSULE | Refills: 0 | Status: SHIPPED | OUTPATIENT
Start: 2022-06-13 | End: 2022-06-20

## 2022-06-13 RX ORDER — FENTANYL CITRATE 50 UG/ML
50 INJECTION, SOLUTION INTRAMUSCULAR; INTRAVENOUS ONCE
Status: COMPLETED | OUTPATIENT
Start: 2022-06-13 | End: 2022-06-13

## 2022-06-13 RX ORDER — ONDANSETRON 4 MG/1
4 TABLET, ORALLY DISINTEGRATING ORAL 3 TIMES DAILY PRN
Qty: 21 TABLET | Refills: 0 | Status: SHIPPED | OUTPATIENT
Start: 2022-06-13

## 2022-06-13 RX ADMIN — FENTANYL CITRATE 50 MCG: 50 INJECTION, SOLUTION INTRAMUSCULAR; INTRAVENOUS at 09:46

## 2022-06-13 RX ADMIN — DICYCLOMINE HYDROCHLORIDE 20 MG: 10 INJECTION INTRAMUSCULAR at 11:11

## 2022-06-13 RX ADMIN — ONDANSETRON 4 MG: 2 INJECTION INTRAMUSCULAR; INTRAVENOUS at 09:46

## 2022-06-13 RX ADMIN — LIDOCAINE HYDROCHLORIDE: 20 SOLUTION ORAL; TOPICAL at 09:48

## 2022-06-13 RX ADMIN — SODIUM CHLORIDE 1000 ML: 9 INJECTION, SOLUTION INTRAVENOUS at 09:45

## 2022-06-13 ASSESSMENT — ENCOUNTER SYMPTOMS
DIARRHEA: 0
COUGH: 0
SHORTNESS OF BREATH: 0
ABDOMINAL PAIN: 1
VOMITING: 0
SINUS PAIN: 0
ABDOMINAL DISTENTION: 0
SINUS PRESSURE: 0
SORE THROAT: 0
NAUSEA: 1
BACK PAIN: 0
COLOR CHANGE: 0
CONSTIPATION: 0

## 2022-06-13 ASSESSMENT — PAIN DESCRIPTION - DESCRIPTORS
DESCRIPTORS: CRAMPING

## 2022-06-13 ASSESSMENT — PAIN DESCRIPTION - LOCATION
LOCATION: ABDOMEN

## 2022-06-13 ASSESSMENT — PAIN SCALES - GENERAL
PAINLEVEL_OUTOF10: 7
PAINLEVEL_OUTOF10: 8
PAINLEVEL_OUTOF10: 8
PAINLEVEL_OUTOF10: 5

## 2022-06-13 ASSESSMENT — PAIN DESCRIPTION - FREQUENCY
FREQUENCY: CONTINUOUS

## 2022-06-13 ASSESSMENT — PAIN DESCRIPTION - ORIENTATION
ORIENTATION: RIGHT;LEFT
ORIENTATION: LEFT;RIGHT
ORIENTATION: RIGHT;LEFT

## 2022-06-13 ASSESSMENT — PAIN DESCRIPTION - PAIN TYPE
TYPE: ACUTE PAIN

## 2022-06-13 ASSESSMENT — PAIN - FUNCTIONAL ASSESSMENT
PAIN_FUNCTIONAL_ASSESSMENT: 0-10

## 2022-06-13 NOTE — ED PROVIDER NOTES
[facility]  EMERGENCY MEDICINE ATTENDING ATTESTATION      Evaluation of Wanda Sahu. Case discussed and care plan developed with resident physician. I agree with the resident physician documentation and plan as documented by him, except if my documentation differs. Patient seen, interviewed and examined by me. I reviewed the medical, surgical, family and social history, medications and allergies. I have reviewed the nursing documentation. Brief H&P   Patient c/o sharp epigastric abdominal pain, similar to previous sickle cell crisis. Patient has not followed up with hematologist in about 4 years since he aged out of [facility] but states that he has sickle cell trait and gets about 1 sickle cell crisis is a year and usually not more often than that. Denies associated symptoms. Physical exam is notable for well appearing, minimal epigastric tenderness, negative Puga sign. Otherwise nonfocal exam      Medical Decision Making   MDM:   1. Undifferentiated epigastric abdominal pain  2. Possible GERD/gastritis  3. Possible sickle cell crisis  Plan:    IV line, labs   Analgesia   IV fluids   Symptomatic treatment   Observation in the ED while awaiting results   Will refer to local hematology and family medicine upon discharge. Please see the resident physician completed note for final disposition except as documented on this attestation. I have reviewed and interpreted all available lab, radiology and ekg results available at the moment. Diagnosis, treatment and disposition plans were discussed and agreed upon by patient. This transcription was electronically signed. It was dictated by use of voice recognition software and electronically transcribed. The transcription may contain errors not detected in proofreading.      I performed direct supervision and was present for the critical portion following procedures: None  Critical care time on this case: None    Electronically signed by Alcides Azar MD on 6/13/22 at 10:47 AM EDT       Alcides Azar MD  06/13/22 1101

## 2022-06-13 NOTE — ED NOTES
Pt up to restroom to provide urine specimen at this time with strong, steady gait. Urine specimen collected and sent to lab. Medicated pt per MAR. VSS. Will monitor.      Karey Melendez RN  06/13/22 6876

## 2022-06-13 NOTE — ED NOTES
In for hourly rounding. Pt resting on cot in position of comfort. Pt remains A&Ox4, resps easy and unlabored. IV continues to infuse and shows no s/s of infection or infiltration. Pt pain continues to slowly improve at this time. Medicated pt per MAR. Monitor remains in place. Pt questioning whether pt can go to work [employer] this\". Updated pt on POC. Will monitor.      Ken Smith RN  06/13/22 4535

## 2022-06-13 NOTE — ED NOTES
In for hourly rounding. Pt resting on cot in position of comfort. Pt remains A&Ox4, resps easy and unlabored. IV infusing and shows no s/s of infection or infiltration. Pt pain slightly improved at this time. Monitor remains in place. Updated pt on POC. Will monitor.      Mary Anne Coleman RN  06/13/22 1649

## 2022-06-13 NOTE — Clinical Note
Antonina Messina was seen and treated in our emergency department on 6/13/2022. He may return to work on 06/14/2022. If you have any questions or concerns, please don't hesitate to call.       Haylie Villaseñor, DO

## 2022-06-13 NOTE — ED NOTES
Pt presents to ED via [address] [facility] for reported sickle cell. With assessment, pt reports having the trait, not the disease. Pt reports waking this morning with L sided abdominal cramping, intermittently radiating to R side. Upon initial assessment, pt is A&Ox4, resps easy and unlabored. Pt reports 8.5/10 abdominal cramping. Pt denies any difficulty with urinating or bowel movements, last BM yesterday. Pt denies chest pain, shortness of breath, n/v/d, cough or fever. IV established with blood drawn and sent to lab. VSS. Awaiting provider assessment.      Corrina Marks, RN  06/13/22 8263

## 2022-06-13 NOTE — ED PROVIDER NOTES
[facility] ENCOUNTER        Pt Name: Latisha Medina  MRN: [id number]  [place] 1995  Date of evaluation: 6/13/2022  Treating Resident Physician: Gilbert Boyce DO  Supervising Physician: Pillo    History obtained from the patient. CHIEF COMPLAINT       Chief Complaint   Patient presents with    Abdominal Pain           HISTORY OF PRESENT ILLNESS    HPI  Latisha Medina is a 32 y.o. male who presents to the emergency department for evaluation of abdominal pain and cramping. Pain rated 7.5 out of 10 while laying. Has increased pain while standing up. Patient states it began just after awakening and is left-sided with intermittent radiation to the right side. He states that he has sickle cell trait, not the disease and that he gets abdominal pain sometimes secondary to this. Denies any headache, fever, vomiting, bowel changes, dysuria, penile discharge, testicular pain. Last bowel movement yesterday. Intermittent nausea over the last 2 days. Does not take any medications. The patient has no other acute complaints at this time. REVIEW OF SYSTEMS   Review of Systems   Constitutional: Negative for activity change, chills and fever. HENT: Negative for sinus pressure, sinus pain and sore throat. Eyes: Negative for visual disturbance. Respiratory: Negative for cough and shortness of breath. Cardiovascular: Negative for chest pain and palpitations. Gastrointestinal: Positive for abdominal pain and nausea. Negative for abdominal distention, constipation, diarrhea and vomiting. Genitourinary: Negative for difficulty urinating, dysuria, penile discharge and testicular pain. Musculoskeletal: Negative for arthralgias, back pain, neck pain and neck stiffness. Skin: Negative for color change and rash. Neurological: Negative for dizziness, weakness, light-headedness, numbness and headaches.          PAST MEDICAL AND SURGICAL HISTORY   History reviewed. No pertinent past medical history. History reviewed. No pertinent surgical history. MEDICATIONS     Current Facility-Administered Medications:     dicyclomine (BENTYL) injection 20 mg, 20 mg, IntraMUSCular, Once, Lashawn Marks,     Current Outpatient Medications:     dicyclomine (BENTYL) 10 MG capsule, Take 1 capsule by mouth 4 times daily (before meals and nightly) for 7 days, Disp: 28 capsule, Rfl: 0    ondansetron (ZOFRAN-ODT) 4 MG disintegrating tablet, Take 1 tablet by mouth 3 times daily as needed for Nausea or Vomiting, Disp: 21 tablet, Rfl: 0      SOCIAL HISTORY     Social History     Social History Narrative    Not on file     Social History     Tobacco Use    Smoking status: Not on file    Smokeless tobacco: Not on file   Substance Use Topics    Alcohol use: Not on file    Drug use: Not on file         ALLERGIES     Allergies   Allergen Reactions    Medical Provider Ez Flu Shot [Influenza Vac Split [address]   History reviewed. No pertinent family history. PREVIOUS RECORDS   Previous records reviewed: Patient last seen in January for COVID-19. PHYSICAL EXAM     ED Triage Vitals [06/13/22 0911]   BP Temp Temp Source Heart Rate Resp SpO2 Height Weight   (!) 131/97 97.8 °F (36.6 °C) Oral 93 18 95 % -- 285 lb (129.3 kg)     Initial vital signs and nursing assessment reviewed and normal. Body mass index is 46 kg/m². Pulsoximetry is normal per my interpretation. Additional Vital Signs:  Vitals:    06/13/22 0911   BP: (!) 131/97   Pulse: 93   Resp: 18   Temp: 97.8 °F (36.6 °C)   SpO2: 95%       Physical Exam  Constitutional:       General: He is not in acute distress. Appearance: Normal appearance. He is not ill-appearing or diaphoretic. HENT:      Head: Normocephalic and atraumatic. Mouth/Throat:      Mouth: Mucous membranes are moist.      Pharynx: Oropharynx is clear.  No oropharyngeal exudate or posterior oropharyngeal erythema. Eyes:      Extraocular Movements: Extraocular movements intact. Conjunctiva/sclera: Conjunctivae normal.      Pupils: Pupils are equal, round, and reactive to light. Cardiovascular:      Rate and Rhythm: Normal rate and regular rhythm. Pulses: Normal pulses. Heart sounds: Normal heart sounds. No murmur heard. No friction rub. No gallop. Pulmonary:      Effort: Pulmonary effort is normal.      Breath sounds: Normal breath sounds. No wheezing, rhonchi or rales. Abdominal:      General: Abdomen is protuberant. Bowel sounds are normal. There is no distension. Palpations: Abdomen is soft. Tenderness: There is abdominal tenderness (Nonperitoneal abdomen, soft.) in the right upper quadrant, periumbilical area and left upper quadrant. There is guarding (Voluntary). There is no rebound. Musculoskeletal:         General: No swelling. Normal range of motion. Cervical back: Normal range of motion. No rigidity. No muscular tenderness. Right lower leg: No edema. Left lower leg: No edema. Skin:     General: Skin is warm and dry. Capillary Refill: Capillary refill takes less than 2 seconds. Findings: No bruising, erythema or lesion. Neurological:      General: No focal deficit present. Mental Status: He is alert and oriented to person, place, and time. Sensory: No sensory deficit. MEDICAL DECISION MAKING   Initial Assessment:   1. Pleasant 59-year-old male laying in bed with a mild amount of of discomfort. Left abdominal with intermittent radiation through the periapical region to the right lateral abdomen. Denies any alcohol or drug use. Vitals are stable without hypotension, fever, tachycardia, tachypnea or hypoxia. Patient's pain level significant and he attributes this to his sickle cell trait. He does not have sickle cell disease.   Differential diagnosis includes but is not limited to gastritis, gastroenteritis, peptic ulcer disease, appendicitis, cholecystitis, pancreatitis, constipation, urinary tract infection, malingering, unlikely testicular torsion, ACS, sickle cell crisis. Plan:    Labs including LFTs and lipase   Urinalysis   Symptomatic management with fluids, GI cocktail, fentanyl, Zofran   Reassessment   Expected disposition discharge home with outpatient follow-up        ED RESULTS   Laboratory results:  Labs Reviewed   CBC WITH AUTO DIFFERENTIAL - Abnormal; Notable for the following components:       Result Value    MCHC 36.3 (*)     MPV 8.4 (*)     All other components within normal limits   HEPATIC FUNCTION PANEL - Abnormal; Notable for the following components: Total Bilirubin 1.4 (*)     All other components within normal limits   BASIC METABOLIC PANEL - Abnormal; Notable for the following components:    CO2 21 (*)     All other components within normal limits   LIPASE   URINALYSIS WITH REFLEX TO CULTURE   ANION GAP   GLOMERULAR FILTRATION RATE, ESTIMATED   OSMOLALITY       Radiologic studies results:  No orders to display       ED Medications administered this visit:   Medications   dicyclomine (BENTYL) injection 20 mg (has no administration in time range)   0.9 % sodium chloride bolus (1,000 mLs IntraVENous New Bag 6/13/22 0945)   aluminum & magnesium hydroxide-simethicone (MAALOX) 30 mL, lidocaine viscous hcl (XYLOCAINE) 5 mL (GI COCKTAIL) ( Oral Given 6/13/22 0948)   fentaNYL (SUBLIMAZE) injection 50 mcg (50 mcg IntraVENous Given 6/13/22 0946)   ondansetron (ZOFRAN) injection 4 mg (4 mg IntraVENous Given 6/13/22 0946)         ED COURSE     ED Course as of 06/13/22 1055   Mon Jun 13, 2022   1016 Patient's pain is much improved. He is no longer having any discomfort or nausea. [EM]   4787 Deairrys pain is still under control. Mild amount of cramping that is tolerable and better than presentation.   We discussed strict return precautions that include worsening abdominal pain, inability tolerate oral intake, fevers. Abdomen remains nonperitoneal, will forego CT scan at this time. He is agreeable to discharge and outpatient follow-up with the strict return precautions. [EM]   1054 Albumin: 5.0 [EM]   1055 Bilirubin, Direct: <0.2 [EM]   1055 Alk Phos: 80 [EM]   1055 AST: 30 [EM]   1055 ALT: 59 [EM]   1055 WBC: 7.2 [EM]   1055 Hemoglobin Quant: 17.1 [EM]      ED Course User Index  [EM] Maty Orourke DO         Strict return precautions and follow up instructions were discussed with the patient prior to discharge, with which the patient agrees. MEDICATION CHANGES     New Prescriptions    DICYCLOMINE (BENTYL) 10 MG CAPSULE    Take 1 capsule by mouth 4 times daily (before meals and nightly) for 7 days    ONDANSETRON (ZOFRAN-ODT) 4 MG DISINTEGRATING TABLET    Take 1 tablet by mouth 3 times daily as needed for Nausea or Vomiting         FINAL DISPOSITION     Final diagnoses:   Generalized abdominal pain   Nausea     Condition: condition: stable  Dispo: Discharge to home      This transcription was electronically signed. Parts of this transcriptions may have been dictated by use of voice recognition software and electronically transcribed, and parts may have been transcribed with the assistance of an ED scribe. The transcription may contain errors not detected in proofreading. Please refer to my supervising physician's documentation if my documentation differs.     Electronically Signed: Maty DO [place], 06/13/22, 10:55 AM       Maty DO Haven  Resident  06/13/22 1050

## 2022-07-17 ENCOUNTER — HOSPITAL ENCOUNTER (EMERGENCY)
Age: 27
Discharge: HOME | End: 2022-07-17
Payer: MEDICARE

## 2022-07-17 VITALS
DIASTOLIC BLOOD PRESSURE: 92 MMHG | SYSTOLIC BLOOD PRESSURE: 158 MMHG | OXYGEN SATURATION: 97 % | HEART RATE: 88 BPM | RESPIRATION RATE: 18 BRPM | TEMPERATURE: 98.5 F

## 2022-07-17 DIAGNOSIS — H65.192 ACUTE EFFUSION OF LEFT EAR: Primary | ICD-10-CM

## 2022-07-17 PROCEDURE — 99283 EMERGENCY DEPT VISIT LOW MDM: CPT

## 2022-07-17 RX ORDER — FLUTICASONE PROPIONATE 50 MCG
1 SPRAY, SUSPENSION (ML) NASAL DAILY
Qty: 16 G | Refills: 0 | Status: SHIPPED | OUTPATIENT
Start: 2022-07-17

## 2022-07-17 RX ORDER — CETIRIZINE HYDROCHLORIDE 10 MG/1
10 TABLET ORAL DAILY
Qty: 30 TABLET | Refills: 0 | Status: SHIPPED | OUTPATIENT
Start: 2022-07-17 | End: 2022-08-16

## 2022-07-18 ASSESSMENT — ENCOUNTER SYMPTOMS
BACK PAIN: 0
COUGH: 0
VOMITING: 0
EYE REDNESS: 0
RHINORRHEA: 0
NAUSEA: 0
ABDOMINAL PAIN: 0
CHEST TIGHTNESS: 0

## 2022-07-18 NOTE — ED NOTES
Pt to the ED via self. Patient presents with complaints of an ear ache for 1 week. Patient states that this is the first time he has been seen for it. Patient is alert and oriented x 4. Respirations are regular and unlabored. Call light within reach.      Rosana Fields RN  07/17/22 2031

## 2022-07-18 NOTE — ED PROVIDER NOTES
[facility] Emergency Department    CHIEF COMPLAINT       Chief Complaint   Patient presents with    Otalgia       Nurses Notes reviewed and I agree except as noted in the HPI. HISTORY OF PRESENT ILLNESS    Wanda Sahu sabine 32 y.o. male who presents to the ED for evaluation of left ear pain. Started over the weekend. Has not gotten better. No fever. HPI was provided by the patient    REVIEW OF SYSTEMS     Review of Systems   Constitutional:  Negative for chills, fatigue and fever. HENT:  Positive for ear pain. Negative for congestion, ear discharge, postnasal drip and rhinorrhea. Eyes:  Negative for redness. Respiratory:  Negative for cough and chest tightness. Cardiovascular:  Negative for chest pain and leg swelling. Gastrointestinal:  Negative for abdominal pain, nausea and vomiting. Genitourinary:  Negative for difficulty urinating, dysuria, enuresis, flank pain and hematuria. Musculoskeletal:  Negative for back pain and joint swelling. Skin:  Negative for rash. Neurological:  Negative for dizziness, light-headedness, numbness and headaches. Psychiatric/Behavioral:  Negative for agitation, behavioral problems and confusion. All other systems negative except as noted. PAST MEDICAL HISTORY   No past medical history on file. SURGICALHISTORY      has no past surgical history on file. CURRENT MEDICATIONS       Discharge Medication List as of 7/17/2022  9:28 PM        CONTINUE these medications which have NOT CHANGED    Details   dicyclomine (BENTYL) 10 MG capsule Take 1 capsule by mouth 4 times daily (before meals and nightly) for 7 days, Disp-28 capsule, R-0Normal      ondansetron (ZOFRAN-ODT) 4 MG disintegrating tablet Take 1 tablet by mouth 3 times daily as needed for Nausea or Vomiting, Disp-21 tablet, R-0Normal             ALLERGIES     is allergic to sulfa antibiotics and medical provider ez flu shot [influenza vac split quad].     FAMILY HISTORY has no family status information on file. family history is not on file. SOCIAL HISTORY       Social History     Socioeconomic History    Marital status: Single     Spouse name: Not on file    Number of children: Not on file    Years of education: Not on file    Highest education level: Not on file   Occupational History    Not on file   Tobacco Use    Smoking status: Not on file    Smokeless tobacco: Not on file   Substance and Sexual Activity    Alcohol use: Not on file    Drug use: Not on file    Sexual activity: Not on file   Other Topics Concern    Not on file   Social History Narrative    Not on file     Social Determinants of Health     Financial Resource Strain: Not on file   Food Insecurity: Not on file   Transportation Needs: Not on file   Physical Activity: Not on file   Stress: Not on file   Social Connections: Not on file   Intimate Partner Violence: Not on file   Housing Stability: Not on file       PHYSICAL EXAM     INITIAL VITALS:  oral temperature is 98.5 °F (36.9 °C). His blood pressure is 158/92 (abnormal) and his pulse is 88. His respiration is 18 and oxygen saturation is 97%. Physical Exam  Constitutional:       Appearance: Normal appearance. He is well-developed. He is not ill-appearing. HENT:      Head: Normocephalic and atraumatic. Right Ear: Tympanic membrane, ear canal and external ear normal.      Left Ear: Ear canal and external ear normal. A middle ear effusion is present. Nose: Nose normal.      Mouth/Throat:      Mouth: Mucous membranes are moist.      Pharynx: Oropharynx is clear. Eyes:      Conjunctiva/sclera: Conjunctivae normal.   Cardiovascular:      Rate and Rhythm: Normal rate. Pulses: Normal pulses. Pulmonary:      Effort: Pulmonary effort is normal.   Abdominal:      Palpations: Abdomen is soft. Musculoskeletal:         General: Normal range of motion. Cervical back: Normal range of motion. Skin:     General: Skin is warm and dry. Capillary Refill: Capillary refill takes less than 2 seconds. Neurological:      General: No focal deficit present. Mental Status: He is alert and oriented to person, place, and time. Psychiatric:         Behavior: Behavior normal.       DIFFERENTIAL DIAGNOSIS:   Otitis media, otitis externa, ear effusion    DIAGNOSTIC RESULTS     EKG: All EKG's are interpreted by the Emergency Department Physician who eithersigns or Co-signs this chart in the absence of a cardiologist.        RADIOLOGY: non-plainfilm images(s) such as CT, Ultrasound and MRI are read by the radiologist.  Plain radiographic images are visualized and preliminarily interpreted by the emergency physician unless otherwise stated below. No orders to display         LABS:   Labs Reviewed - No data to display    EMERGENCY DEPARTMENT COURSE:   Vitals:    Vitals:    07/17/22 2030   BP: (!) 158/92   Pulse: 88   Resp: 18   Temp: 98.5 °F (36.9 °C)   TempSrc: Oral   SpO2: 97%                                   Internal Administration   First Dose      Second Dose           Last COVID Lab SARS-CoV-2 (no units)   Date Value   09/27/2021 Not Detected     SARS-CoV-2, NAAT (no units)   Date Value   01/01/2022 DETECTED (AA)            MDM      Patient was seen and evaluated in the emergency department, patient appeared to be in stable condition. Vital signs assessed, no abnormality noted. Physical exam completed. Left mid ear effusion noted. No labs or imaging are necessary to be Ordered. Based on my physical exam and work up I believe the patient has ear effusion. I discussed my findings and plan of care with patient. They are amenable with zyrtec and flonase. While here in the emergency department they maintained a stable course and were appropriate for discharge. No notes of EC Admission Criteria type on file. Medications - No data to display    Please note that the patient was evaluated during a pandemic.   All efforts were made for HIPPA compliance as well as provision of appropriate care. Patient was seen independently by myself. The patient's final impression and disposition and plan was determined by myself. Strict return precautions and follow up instructions were discussed with the patient prior to discharge, with which the patient agrees. Physical assessment findings, diagnostic testing(s) if applicable, and vital signs reviewed with patient/patient representative. Questions answered. Medications asdirected, including OTC medications for supportive care. Education provided on medications. Differential diagnosis(s) discussed with patient/patient representative. Home care/self care instructions reviewed withpatient/patient representative. Patient is to follow-up with family care provider in 2-3 days if no improvement. Patient is to go to the emergency department if symptoms worsen. Patient/patient representative isaware of care plan, questions answered, verbalizes understanding and is in agreement. CRITICAL CARE:   None    CONSULTS:  None    PROCEDURES:  None    FINAL IMPRESSION     1.  Acute effusion of left ear          DISPOSITION/PLAN   DISPOSITION Decision To Discharge 07/17/2022 09:27:22 PM      PATIENT REFERREDTO:  JESSICA Rausch - CNP  [place] [address] [place] [address]  [phone]    Schedule an appointment as soon as possible for a visit in 2 days  For follow up      DISCHARGE MEDICATIONS:  Discharge Medication List as of 7/17/2022  9:28 PM        START taking these medications    Details   cetirizine (ZYRTEC) 10 MG tablet Take 1 tablet by mouth in the morning., Disp-30 tablet, R-0Normal      fluticasone (FLONASE) 50 MCG/ACT nasal spray 1 spray by Each Nostril route in the morning., Disp-16 g, R-0Normal             (Please note that portions of this note were completed with a voice recognition program.  Efforts were made to edit the dictations but occasionally words are mis-transcribed.)         JESSICA Galicia - BERTIN Paige, JESSICA - BERTIN  07/18/22 0130

## 2022-10-09 ENCOUNTER — HOSPITAL ENCOUNTER (EMERGENCY)
Age: 27
Discharge: HOME | End: 2022-10-09
Attending: STUDENT IN AN ORGANIZED HEALTH CARE EDUCATION/TRAINING PROGRAM
Payer: MEDICARE

## 2022-10-09 VITALS
HEART RATE: 72 BPM | TEMPERATURE: 98.8 F | SYSTOLIC BLOOD PRESSURE: 128 MMHG | DIASTOLIC BLOOD PRESSURE: 82 MMHG | RESPIRATION RATE: 18 BRPM | OXYGEN SATURATION: 97 %

## 2022-10-09 DIAGNOSIS — H10.9 BACTERIAL CONJUNCTIVITIS OF RIGHT EYE: Primary | ICD-10-CM

## 2022-10-09 PROCEDURE — 6370000000 HC RX 637 (ALT 250 FOR IP): Performed by: STUDENT IN AN ORGANIZED HEALTH CARE EDUCATION/TRAINING PROGRAM

## 2022-10-09 PROCEDURE — 99283 EMERGENCY DEPT VISIT LOW MDM: CPT

## 2022-10-09 RX ORDER — CETIRIZINE HYDROCHLORIDE 10 MG/1
10 TABLET ORAL ONCE
Status: COMPLETED | OUTPATIENT
Start: 2022-10-09 | End: 2022-10-09

## 2022-10-09 RX ORDER — POLYMYXIN B SULFATE AND TRIMETHOPRIM 1; 10000 MG/ML; [USP'U]/ML
1 SOLUTION OPHTHALMIC
Status: DISCONTINUED | OUTPATIENT
Start: 2022-10-09 | End: 2022-10-09 | Stop reason: HOSPADM

## 2022-10-09 RX ADMIN — CETIRIZINE HYDROCHLORIDE 10 MG: 10 TABLET, FILM COATED ORAL at 19:11

## 2022-10-09 RX ADMIN — POLYMYXIN B SULFATE AND TRIMETHOPRIM 1 DROP: 10000; 1 SOLUTION OPHTHALMIC at 19:11

## 2022-10-09 ASSESSMENT — ENCOUNTER SYMPTOMS
SHORTNESS OF BREATH: 0
SORE THROAT: 0
EYE REDNESS: 1
EYE ITCHING: 1
PHOTOPHOBIA: 0
BACK PAIN: 0
VOMITING: 0
COUGH: 0
NAUSEA: 0
EYE DISCHARGE: 1
DIARRHEA: 0
TROUBLE SWALLOWING: 0
ABDOMINAL PAIN: 0

## 2022-10-09 ASSESSMENT — TONOMETRY
OD_IOP_MMHG: 20
IOP_AUTOMATED: 1

## 2022-10-09 ASSESSMENT — VISUAL ACUITY
OD: 20/70
OS: 20/50
OU: 20/50

## 2022-10-09 ASSESSMENT — PAIN - FUNCTIONAL ASSESSMENT: PAIN_FUNCTIONAL_ASSESSMENT: NONE - DENIES PAIN

## 2022-10-09 NOTE — ED TRIAGE NOTES
P ptresents to the ED with c/o conjuctivitis. Pt reports this has been going on for 4 days. Pt has had no relief. Pt right eye red and swollen, pt not complaining of pain at this time.  vss

## 2022-10-09 NOTE — ED PROVIDER NOTES
[facility] ENCOUNTER          Pt Name: Yair Gabriel  MRN: [id number]  [place] 1995  Date of evaluation: 10/9/2022  Treating Resident Physician: Julien West MD  Supervising Physician: Rissa Faulkner MD      CHIEF COMPLAINT       Chief Complaint   Patient presents with    Conjunctivitis     History obtained from the patient. HISTORY OF PRESENT ILLNESS    HPI  Yair Gabriel is a 32 y.o. male who presents to the emergency department for evaluation of right eye discharge, itchiness and vision loss. Patient reports he was diagnosed with bacterial conjunctivitis 4 days ago. He was started on sulfa eyedrops. Reports that itchiness is not improved and discharged to decrease. Denies fevers, pain with eye movements. The patient has no other acute complaints at this time. REVIEW OF SYSTEMS   Review of Systems   Constitutional:  Negative for chills and fever. HENT:  Negative for sore throat and trouble swallowing. Eyes:  Positive for discharge, redness, itching and visual disturbance. Negative for photophobia. Respiratory:  Negative for cough and shortness of breath. Cardiovascular:  Negative for chest pain, palpitations and leg swelling. Gastrointestinal:  Negative for abdominal pain, diarrhea, nausea and vomiting. Genitourinary:  Negative for difficulty urinating and flank pain. Musculoskeletal:  Negative for arthralgias, back pain, myalgias and neck pain. Skin:  Negative for rash. Neurological:  Negative for seizures, syncope, weakness and headaches. PAST MEDICAL AND SURGICAL HISTORY   No past medical history on file. No past surgical history on file. MEDICATIONS   No current facility-administered medications for this encounter.     Current Outpatient Medications:     fluticasone (FLONASE) 50 MCG/ACT nasal spray, 1 spray by Each Nostril route in the morning., Disp: 16 g, Rfl: 0    dicyclomine (BENTYL) 10 MG capsule, Take 1 capsule by mouth 4 times daily (before meals and nightly) for 7 days, Disp: 28 capsule, Rfl: 0    ondansetron (ZOFRAN-ODT) 4 MG disintegrating tablet, Take 1 tablet by mouth 3 times daily as needed for Nausea or Vomiting, Disp: 21 tablet, Rfl: 0      SOCIAL HISTORY     Social History     Social History Narrative    Not on file            ALLERGIES     Allergies   Allergen Reactions    Sulfa Antibiotics Hives    Medical Provider Ez Flu Shot [Influenza Vac Split Quad]          FAMILY HISTORY   No family history on file. PREVIOUS RECORDS   Previous records reviewed: I reviewed the patient's past medical records including relevant labs, imaging and procedures. PHYSICAL EXAM     ED Triage Vitals [10/09/22 1723]   BP Temp Temp Source Heart Rate Resp SpO2 Height Weight   128/82 98.8 °F (37.1 °C) Oral 72 18 97 % -- --     Initial vital signs and nursing assessment reviewed and normal. There is no height or weight on file to calculate BMI. Pulsoximetry is normal per my interpretation. Additional Vital Signs:  Vitals:    10/09/22 1723   BP: 128/82   Pulse: 72   Resp: 18   Temp: 98.8 °F (37.1 °C)   SpO2: 97%       Physical Exam  Vitals and nursing note reviewed. Constitutional:       General: He is not in acute distress. Appearance: Normal appearance. He is normal weight. He is not toxic-appearing. HENT:      Head: Normocephalic and atraumatic. Right Ear: Tympanic membrane normal.      Left Ear: Tympanic membrane normal.      Nose: Nose normal.      Mouth/Throat:      Mouth: Mucous membranes are moist.      Pharynx: Oropharynx is clear. Eyes:      General: No scleral icterus. Intraocular pressure: Right eye pressure is 20 mmHg. Measurements were taken using an automated tonometer. Extraocular Movements: Extraocular movements intact. Conjunctiva/sclera:      Right eye: Right conjunctiva is injected. Exudate present.       Pupils: Pupils are equal, round, and reactive to light. Cardiovascular:      Rate and Rhythm: Normal rate and regular rhythm. Pulses: Normal pulses. Heart sounds: Normal heart sounds. No murmur heard. No friction rub. No gallop. Pulmonary:      Effort: Pulmonary effort is normal.      Breath sounds: Normal breath sounds. No wheezing or rales. Abdominal:      Palpations: Abdomen is soft. Tenderness: There is no abdominal tenderness. There is no guarding or rebound. Musculoskeletal:         General: Normal range of motion. Cervical back: Normal range of motion and neck supple. Right lower leg: No edema. Left lower leg: No edema. Skin:     General: Skin is warm and dry. Capillary Refill: Capillary refill takes less than 2 seconds. Neurological:      General: No focal deficit present. Mental Status: He is alert and oriented to person, place, and time. Cranial Nerves: No cranial nerve deficit. Sensory: No sensory deficit. Motor: No weakness. Coordination: Coordination normal.           ED RESULTS   Laboratory results:  Labs Reviewed - No data to display    Radiologic studies results:  No orders to display       ED Medications administered this visit: Medications - No data to display       [address]   Given the patient's above chief complaint and findings on history and physical examination, I thought it was appropriate to consider the following emergency medical conditions:  Bacterial conjunctivitis, proctocolitis, allergic reaction, acute angle-closure glaucoma, corneal abrasion  Although some of these diagnoses are unlikely they were considered in my medical decision making. Patient presents with right conjunctival injection with purulent drainage. She was started on sulfa eyedrops however he does have an allergy to sulfa antibiotics. I suspect this likely worsened the right eye.   Visual cue 20 20 out of 50 bilaterally, 20 out of 70 in the right eye and 20/50 in the left eye. Intraocular pressure was 20. Patient will be started on Polytrim with follow-up. Strict return precautions and follow up instructions were discussed with the patient prior to discharge, with which the patient agrees. MEDICATION CHANGES     New Prescriptions    No medications on file         FINAL DISPOSITION     Final diagnoses:   Bacterial conjunctivitis of right eye     Condition: condition: stable  Dispo: Discharge to home      This transcription was electronically signed. Parts of this transcriptions may have been dictated by use of voice recognition software and electronically transcribed, and parts may have been transcribed with the assistance of an ED scribe. The transcription may contain errors not detected in proofreading. Please refer to my supervising physician's documentation if my documentation differs.     Electronically Signed: Ge Bahena MD, 10/09/22, 6:39 PM         Carlotta Rosales MD  Resident  10/09/22 0336

## 2023-02-21 NOTE — CARE COORDINATION
Attempted to reach [address] today for ED f/u COVID Outreach. No answer. VM not set up. Referral faxed. Confirmation received.

## 2023-03-09 ENCOUNTER — HOSPITAL ENCOUNTER (EMERGENCY)
Age: 28
Discharge: HOME | End: 2023-03-09
Attending: EMERGENCY MEDICINE
Payer: MEDICARE

## 2023-03-09 VITALS
RESPIRATION RATE: 16 BRPM | WEIGHT: 230 LBS | SYSTOLIC BLOOD PRESSURE: 142 MMHG | OXYGEN SATURATION: 96 % | HEIGHT: 66 IN | DIASTOLIC BLOOD PRESSURE: 88 MMHG | BODY MASS INDEX: 36.96 KG/M2 | HEART RATE: 95 BPM | TEMPERATURE: 98.2 F

## 2023-03-09 DIAGNOSIS — H65.03 BILATERAL ACUTE SEROUS OTITIS MEDIA, RECURRENCE NOT SPECIFIED: Primary | ICD-10-CM

## 2023-03-09 PROCEDURE — 99283 EMERGENCY DEPT VISIT LOW MDM: CPT | Performed by: EMERGENCY MEDICINE

## 2023-03-09 RX ORDER — AMOXICILLIN AND CLAVULANATE POTASSIUM 875; 125 MG/1; MG/1
1 TABLET, FILM COATED ORAL 2 TIMES DAILY
Qty: 20 TABLET | Refills: 0 | Status: SHIPPED | OUTPATIENT
Start: 2023-03-09 | End: 2023-03-19

## 2023-03-09 ASSESSMENT — ENCOUNTER SYMPTOMS
FACIAL SWELLING: 0
EYES NEGATIVE: 1
RESPIRATORY NEGATIVE: 1
RHINORRHEA: 0
SINUS PAIN: 0
GASTROINTESTINAL NEGATIVE: 1
SORE THROAT: 0
TROUBLE SWALLOWING: 0

## 2023-03-09 ASSESSMENT — PAIN - FUNCTIONAL ASSESSMENT: PAIN_FUNCTIONAL_ASSESSMENT: 0-10

## 2023-03-09 ASSESSMENT — PAIN SCALES - GENERAL: PAINLEVEL_OUTOF10: 6

## 2023-03-09 ASSESSMENT — PAIN DESCRIPTION - LOCATION: LOCATION: EAR

## 2023-03-10 NOTE — ED PROVIDER NOTES
PATIENT NAME: Brianna Ace  MRN: [id number]  : 1995  MILLS: 3/9/2023    I performed a history and physical examination of the patient and discussed management with the Resident. I reviewed the Resident's note and agree with the documented findings and plan of care. Any areas of disagreement are noted on the chart. I was personally present for the key portions of any procedures and have documented in the chart those procedures where I was not present during the key portions. I have reviewed the emergency nurses triage note and agree with the chief complaint, past medical history, past surgical history, allergies, medications, social and family history as documented unless otherwise noted below. MEDICAL DEDISION MAKING (MDM)     Brianna Ace is a 32 y.o. male who presents to Emergency Department with Otalgia     Patient presents for evaluation of bilateral ear pain over last 2 days duration. No fever or chills. History of prior OM requiring tympanostomy tubes. Exam: AVSS. Bilateral otitis media findings, greater to left side. No mastoid process swelling or tenderness. We will treat this as acute otitis media by prescribing Augmentin, PCP follow-up in 3-5 days to consider ENT referral.    Vitals:    23   BP: (!) 142/88   Pulse: 95   Resp: 16   Temp: 98.2 °F (36.8 °C)   TempSrc: Oral   SpO2: 96%   Weight: 230 lb (104.3 kg)   Height: 5' 6\" (1.676 m)     Medications - No data to display  Labs Reviewed - No data to display  No orders to display       FINAL IMPRESSION AND DISPOSITION      1. Bilateral acute serous otitis media, recurrence not specified        DISPOSITION Decision To Discharge 2023 09:15:27 PM      PATIENT REFERRED TO:  No follow-up provider specified.     DISCHARGE MEDICATIONS:  New Prescriptions    AMOXICILLIN-CLAVULANATE (AUGMENTIN) 875-125 MG PER TABLET    Take 1 tablet by mouth 2 times daily for 10 days       (Please note that portions of this note were completed with a voice recognition program.  Efforts were made to edit the dictations but occasionally words aremis-transcribed.)    MD Joya Ledezma MD  03/09/23 2120

## 2023-03-10 NOTE — ED PROVIDER NOTES
[facility] ENCOUNTER          Pt Name: Suzan Vicente  MRN: [id number]  [place] 1995  Date of evaluation: 3/9/2023  Treating Resident Physician: Massiel Petty DO  Supervising Physician: Ashia Godl MD    History obtained from the patient. CHIEF COMPLAINT       Chief Complaint   Patient presents with    Otalgia           HISTORY OF PRESENT ILLNESS    HPI  Suzan Vicente is a 32 y.o. male who presents to the emergency department for evaluation of bilateral ear pain. Bilateral ear pain which started 2 days ago. Says he has history of multiple ear infections in the past with bilateral tympanostomy tubes. He says this feels like an ear infection. Hearing is intact. No tenderness or color changes around mastoid. Denies cough, fever, runny nose, diarrhea, and hearing loss. The patient has no other acute complaints at this time. REVIEW OF SYSTEMS   Review of Systems   Constitutional: Negative. HENT:  Positive for ear pain (Bilateral). Negative for congestion, ear discharge, facial swelling, hearing loss, rhinorrhea, sinus pain, sneezing, sore throat and trouble swallowing. Eyes: Negative. Respiratory: Negative. Cardiovascular: Negative. Gastrointestinal: Negative. Genitourinary: Negative. Neurological: Negative. Psychiatric/Behavioral: Negative. PAST MEDICAL AND SURGICAL HISTORY   History reviewed. No pertinent past medical history. History reviewed. No pertinent surgical history. MEDICATIONS   No current facility-administered medications for this encounter.     Current Outpatient Medications:     amoxicillin-clavulanate (AUGMENTIN) 875-125 MG per tablet, Take 1 tablet by mouth 2 times daily for 10 days, Disp: 20 tablet, Rfl: 0    fluticasone (FLONASE) 50 MCG/ACT nasal spray, 1 spray by Each Nostril route in the morning., Disp: 16 g, Rfl: 0    dicyclomine (BENTYL) 10 MG capsule, Take 1 capsule by mouth 4 times daily (before meals and nightly) for 7 days, Disp: 28 capsule, Rfl: 0    ondansetron (ZOFRAN-ODT) 4 MG disintegrating tablet, Take 1 tablet by mouth 3 times daily as needed for Nausea or Vomiting, Disp: 21 tablet, Rfl: 0      SOCIAL HISTORY     Social History     Social History Narrative    Not on file            ALLERGIES     Allergies   Allergen Reactions    Sulfa Antibiotics Hives    Medical Provider Ez Flu Shot [Influenza Vac Split Quad]          FAMILY HISTORY   History reviewed. No pertinent family history. PREVIOUS RECORDS   Previous records reviewed:  Previous ED note note reviewed . PHYSICAL EXAM     ED Triage Vitals [03/09/23 2006]   BP Temp Temp Source Heart Rate Resp SpO2 Height Weight   (!) 142/88 98.2 °F (36.8 °C) Oral 95 16 96 % 5' 6\" (1.676 m) 230 lb (104.3 kg)     Initial vital signs and nursing assessment reviewed and normal. Body mass index is 37.12 kg/m². Pulsoximetry is normal per my interpretation. Additional Vital Signs:  Vitals:    03/09/23 2006   BP: (!) 142/88   Pulse: 95   Resp: 16   Temp: 98.2 °F (36.8 °C)   SpO2: 96%       Physical Exam  Constitutional:       Appearance: Normal appearance. He is normal weight. HENT:      Head: Normocephalic and atraumatic. Right Ear: Tympanic membrane normal. There is no impacted cerumen. Left Ear: Tympanic membrane normal. There is no impacted cerumen. Ears:      Comments: Erythema of external auditory canal bilaterally  Cardiovascular:      Rate and Rhythm: Normal rate and regular rhythm. Pulses: Normal pulses. Heart sounds: Normal heart sounds. Pulmonary:      Effort: Pulmonary effort is normal.      Breath sounds: Normal breath sounds. Skin:     Capillary Refill: Capillary refill takes less than 2 seconds. Neurological:      Mental Status: He is alert. Psychiatric:         Mood and Affect: Mood normal.         Behavior: Behavior normal.         Thought Content:  Thought content normal.           MEDICAL DECISION MAKING     Differential diagnosis: Acute otitis media versus otitis media with effusion versus cholesteatoma versus mastoiditis    25-year-old male with past medical history of multiple recurrent ear infections presents for bilateral ear pain and feeling of \"ear fullness\". Hearing is intact. No tenderness of mastoid or overlying skin changes. Otoscopic exam revealed erythema of the auditory canal but tympanic membrane was clear. Diagnosed with acute otitis media. Discharged with Augmentin for 10 days. ED RESULTS   Laboratory results:  Labs Reviewed - No data to display    Radiologic studies results:  No orders to display       ED Medications administered this visit: Medications - No data to display      ED COURSE     ED Course as of 03/09/23 2221   Thu Mar 09, 2023   2220 Exam with otoscope revealed erythema of external auditory canal.  Tympanic membranes were clear bilaterally [KR]      ED Course User Index  [KR] Naty Sorenson DO         Strict return precautions and follow up instructions were discussed with the patient prior to discharge, with which the patient agrees. MEDICATION CHANGES     Discharge Medication List as of 3/9/2023  9:21 PM        START taking these medications    Details   amoxicillin-clavulanate (AUGMENTIN) 875-125 MG per tablet Take 1 tablet by mouth 2 times daily for 10 days, Disp-20 tablet, R-0Normal               FINAL DISPOSITION      Final diagnoses:   Bilateral acute serous otitis media, recurrence not specified     Condition: condition: good  Dispo: Discharge to home      This transcription was electronically signed. Parts of this transcriptions may have been dictated by use of voice recognition software and electronically transcribed, and parts may have been transcribed with the assistance of an ED scribe. The transcription may contain errors not detected in proofreading.   Please refer to my supervising physician's documentation if my documentation differs.     Electronically Signed: Amanda Layne DO, 03/09/23, 10:21 PM         Amanda Layne DO  Resident  03/09/23 5735

## 2023-03-10 NOTE — DISCHARGE INSTRUCTIONS
Take antibiotics for next 10 days  Use ibuprofen for ear pain  Go to PCP after finishing antibiotics if your pain has not resolved

## 2024-01-07 ENCOUNTER — HOSPITAL ENCOUNTER (EMERGENCY)
Age: 29
Discharge: HOME | End: 2024-01-07
Attending: EMERGENCY MEDICINE
Payer: MEDICAID

## 2024-01-07 VITALS
HEART RATE: 97 BPM | SYSTOLIC BLOOD PRESSURE: 139 MMHG | OXYGEN SATURATION: 96 % | TEMPERATURE: 98.1 F | DIASTOLIC BLOOD PRESSURE: 83 MMHG | BODY MASS INDEX: 37.12 KG/M2 | WEIGHT: 230 LBS | RESPIRATION RATE: 17 BRPM

## 2024-01-07 DIAGNOSIS — R19.7 DIARRHEA, UNSPECIFIED TYPE: Primary | ICD-10-CM

## 2024-01-07 LAB
ALBUMIN SERPL BCG-MCNC: 4.7 G/DL (ref 3.5–5.1)
ALP SERPL-CCNC: 77 U/L (ref 38–126)
ALT SERPL W/O P-5'-P-CCNC: 63 U/L (ref 11–66)
AMPHETAMINES UR QL SCN: NEGATIVE
ANION GAP SERPL CALC-SCNC: 9 MEQ/L (ref 8–16)
AST SERPL-CCNC: 29 U/L (ref 5–40)
BARBITURATES UR QL SCN: NEGATIVE
BASOPHILS ABSOLUTE: 0.1 THOU/MM3 (ref 0–0.1)
BASOPHILS NFR BLD AUTO: 0.9 %
BENZODIAZ UR QL SCN: NEGATIVE
BILIRUB SERPL-MCNC: 1.1 MG/DL (ref 0.3–1.2)
BILIRUB UR QL STRIP.AUTO: NEGATIVE
BUN SERPL-MCNC: 11 MG/DL (ref 7–22)
BZE UR QL SCN: NEGATIVE
CALCIUM SERPL-MCNC: 9.8 MG/DL (ref 8.5–10.5)
CANNABINOIDS UR QL SCN: POSITIVE
CHARACTER UR: CLEAR
CHLORIDE SERPL-SCNC: 105 MEQ/L (ref 98–111)
CO2 SERPL-SCNC: 27 MEQ/L (ref 23–33)
COLOR: YELLOW
CREAT SERPL-MCNC: 0.9 MG/DL (ref 0.4–1.2)
DEPRECATED RDW RBC AUTO: 38 FL (ref 35–45)
EOSINOPHIL NFR BLD AUTO: 2.3 %
EOSINOPHILS ABSOLUTE: 0.2 THOU/MM3 (ref 0–0.4)
ERYTHROCYTE [DISTWIDTH] IN BLOOD BY AUTOMATED COUNT: 12.8 % (ref 11.5–14.5)
ETHANOL SERPL-MCNC: < 0.01 %
FENTANYL: NEGATIVE
FLUAV RNA RESP QL NAA+PROBE: NOT DETECTED
FLUBV RNA RESP QL NAA+PROBE: NOT DETECTED
GFR SERPL CREATININE-BSD FRML MDRD: > 60 ML/MIN/1.73M2
GLUCOSE SERPL-MCNC: 99 MG/DL (ref 70–108)
GLUCOSE UR QL STRIP.AUTO: NEGATIVE MG/DL
HCT VFR BLD AUTO: 44.8 % (ref 42–52)
HGB BLD-MCNC: 15.9 GM/DL (ref 14–18)
HGB UR QL STRIP.AUTO: NEGATIVE
IMM GRANULOCYTES # BLD AUTO: 0.06 THOU/MM3 (ref 0–0.07)
IMM GRANULOCYTES NFR BLD AUTO: 0.9 %
KETONES UR QL STRIP.AUTO: NEGATIVE
LIPASE SERPL-CCNC: 19.5 U/L (ref 5.6–51.3)
LYMPHOCYTES ABSOLUTE: 2.6 THOU/MM3 (ref 1–4.8)
LYMPHOCYTES NFR BLD AUTO: 37 %
MCH RBC QN AUTO: 29 PG (ref 26–33)
MCHC RBC AUTO-ENTMCNC: 35.5 GM/DL (ref 32.2–35.5)
MCV RBC AUTO: 81.8 FL (ref 80–94)
MONOCYTES ABSOLUTE: 0.4 THOU/MM3 (ref 0.4–1.3)
MONOCYTES NFR BLD AUTO: 5.8 %
NEUTROPHILS NFR BLD AUTO: 53.1 %
NITRITE UR QL STRIP: NEGATIVE
NRBC BLD AUTO-RTO: 0 /100 WBC
OPIATES UR QL SCN: NEGATIVE
OSMOLALITY SERPL CALC.SUM OF ELEC: 280.7 MOSMOL/KG (ref 275–300)
OXYCODONE: NEGATIVE
PCP UR QL SCN: NEGATIVE
PH UR STRIP.AUTO: 5.5 [PH] (ref 5–9)
PLATELET # BLD AUTO: 342 THOU/MM3 (ref 130–400)
PMV BLD AUTO: 8.8 FL (ref 9.4–12.4)
POTASSIUM SERPL-SCNC: 3.9 MEQ/L (ref 3.5–5.2)
PROT SERPL-MCNC: 7.3 G/DL (ref 6.1–8)
PROT UR STRIP.AUTO-MCNC: NEGATIVE MG/DL
RBC # BLD AUTO: 5.48 MILL/MM3 (ref 4.7–6.1)
SARS-COV-2 RNA RESP QL NAA+PROBE: NOT DETECTED
SEGMENTED NEUTROPHILS ABSOLUTE COUNT: 3.7 THOU/MM3 (ref 1.8–7.7)
SODIUM SERPL-SCNC: 141 MEQ/L (ref 135–145)
SP GR UR REFRACT.AUTO: 1.02 (ref 1–1.03)
UROBILINOGEN, URINE: 0.2 EU/DL (ref 0–1)
WBC # BLD AUTO: 6.9 THOU/MM3 (ref 4.8–10.8)
WBC #/AREA URNS HPF: NEGATIVE /[HPF]

## 2024-01-07 PROCEDURE — 83690 ASSAY OF LIPASE: CPT

## 2024-01-07 PROCEDURE — 87636 SARSCOV2 & INF A&B AMP PRB: CPT

## 2024-01-07 PROCEDURE — 96372 THER/PROPH/DIAG INJ SC/IM: CPT

## 2024-01-07 PROCEDURE — 99284 EMERGENCY DEPT VISIT MOD MDM: CPT

## 2024-01-07 PROCEDURE — 80307 DRUG TEST PRSMV CHEM ANLYZR: CPT

## 2024-01-07 PROCEDURE — 80053 COMPREHEN METABOLIC PANEL: CPT

## 2024-01-07 PROCEDURE — 81003 URINALYSIS AUTO W/O SCOPE: CPT

## 2024-01-07 PROCEDURE — 85025 COMPLETE CBC W/AUTO DIFF WBC: CPT

## 2024-01-07 PROCEDURE — 6360000002 HC RX W HCPCS: Performed by: EMERGENCY MEDICINE

## 2024-01-07 PROCEDURE — 36415 COLL VENOUS BLD VENIPUNCTURE: CPT

## 2024-01-07 PROCEDURE — 82077 ASSAY SPEC XCP UR&BREATH IA: CPT

## 2024-01-07 RX ORDER — DROPERIDOL 2.5 MG/ML
1.25 INJECTION, SOLUTION INTRAMUSCULAR; INTRAVENOUS ONCE
Status: COMPLETED | OUTPATIENT
Start: 2024-01-07 | End: 2024-01-07

## 2024-01-07 RX ORDER — ONDANSETRON 4 MG/1
4 TABLET, ORALLY DISINTEGRATING ORAL 3 TIMES DAILY PRN
Qty: 21 TABLET | Refills: 0 | Status: SHIPPED | OUTPATIENT
Start: 2024-01-07

## 2024-01-07 RX ADMIN — DROPERIDOL 1.25 MG: 2.5 INJECTION, SOLUTION INTRAMUSCULAR; INTRAVENOUS at 05:32

## 2024-01-07 ASSESSMENT — PAIN - FUNCTIONAL ASSESSMENT: PAIN_FUNCTIONAL_ASSESSMENT: 0-10

## 2024-01-07 ASSESSMENT — PAIN SCALES - GENERAL: PAINLEVEL_OUTOF10: 3

## 2024-01-07 NOTE — ED PROVIDER NOTES
[facility] EMERGENCY DEPT      EMERGENCY MEDICINE     Pt Name: Wanda Hathaway  MRN: [id number]  Birthdate 1995  Date of evaluation: 1/7/2024  Provider: GINNA VANCE MD    CHIEF COMPLAINT       Chief Complaint   Patient presents with    Abdominal Cramping     HISTORY OF PRESENT ILLNESS   Wanda Hathaway is a pleasant 28 y.o. male who presents to the emergency department from from home, as a walk in to the ED lobby for evaluation of abdominal cramping.  Patient states this started yesterday.  He endorses that he has had 2 episodes of diarrhea.  He denies any fever, nausea, or vomiting.  Patient states that he has sickle cell.  He is concerned that his abdominal pain is from his sickle cell.  He states he does not take any medications for his sickle cell.  Patient denies eating anything unusual.  He had pizza for dinner.  He has not been around any sick contacts that he is aware of.    PASTMEDICAL HISTORY   No past medical history on file.    There is no problem list on file for this patient.    SURGICAL HISTORY     No past surgical history on file.    CURRENT MEDICATIONS       Discharge Medication List as of 1/7/2024  6:55 AM        CONTINUE these medications which have NOT CHANGED    Details   fluticasone (FLONASE) 50 MCG/ACT nasal spray 1 spray by Each Nostril route in the morning., Disp-16 g, R-0Normal      dicyclomine (BENTYL) 10 MG capsule Take 1 capsule by mouth 4 times daily (before meals and nightly) for 7 days, Disp-28 capsule, R-0Normal      !! ondansetron (ZOFRAN-ODT) 4 MG disintegrating tablet Take 1 tablet by mouth 3 times daily as needed for Nausea or Vomiting, Disp-21 tablet, R-0Normal       !! - Potential duplicate medications found. Please discuss with provider.          ALLERGIES     is allergic to sulfa antibiotics and medical provider ez flu shot [influenza vac split quad].    FAMILY HISTORY     has no family status information on file.        SOCIAL HISTORY

## 2024-01-07 NOTE — DISCHARGE INSTRUCTIONS
You were seen for abdominal cramping and diarrhea.  No evidence of infection, ischemia, or obstructive process was found on evaluation today.  This is most likely a viral stomach infection which will improve on its own.  Please use Zofran as needed for any nausea that you may experience.    On review of your medical records you have sickle cell trait.  This results in no symptoms of sickle cell disease.  It does need that you need to be aware that you might pass on the trait your child or if you have a child with someone who has sickle cell trait you might have a child that develop sickle cell.    If you develop fever, uncontrollable diarrhea, uncontrolled vomiting, worsening pain, or any other concerning symptoms he is return to emergency room for reevaluation.

## 2024-01-07 NOTE — ED NOTES
Pt arrives with cramping in the abdomen and states he has sickle cell. States started 1/6. Respirations easy and unlabored. Denies any needs at this time.

## 2024-07-29 ENCOUNTER — HOSPITAL ENCOUNTER (EMERGENCY)
Age: 29
Discharge: HOME | End: 2024-07-29
Attending: FAMILY MEDICINE
Payer: MEDICARE

## 2024-07-29 ENCOUNTER — APPOINTMENT (OUTPATIENT)
Dept: ULTRASOUND IMAGING | Age: 29
End: 2024-07-29
Payer: MEDICARE

## 2024-07-29 VITALS
HEIGHT: 66 IN | WEIGHT: 230 LBS | TEMPERATURE: 98.3 F | OXYGEN SATURATION: 98 % | HEART RATE: 85 BPM | BODY MASS INDEX: 36.96 KG/M2 | RESPIRATION RATE: 16 BRPM | SYSTOLIC BLOOD PRESSURE: 133 MMHG | DIASTOLIC BLOOD PRESSURE: 97 MMHG

## 2024-07-29 DIAGNOSIS — S39.94XA SCROTAL TRAUMA, INITIAL ENCOUNTER: Primary | ICD-10-CM

## 2024-07-29 PROCEDURE — 6370000000 HC RX 637 (ALT 250 FOR IP): Performed by: FAMILY MEDICINE

## 2024-07-29 PROCEDURE — 76870 US EXAM SCROTUM: CPT

## 2024-07-29 PROCEDURE — 99284 EMERGENCY DEPT VISIT MOD MDM: CPT

## 2024-07-29 RX ORDER — IBUPROFEN 200 MG
600 TABLET ORAL ONCE
Status: COMPLETED | OUTPATIENT
Start: 2024-07-29 | End: 2024-07-29

## 2024-07-29 RX ADMIN — IBUPROFEN 600 MG: 200 TABLET, FILM COATED ORAL at 14:57

## 2024-07-29 ASSESSMENT — PAIN - FUNCTIONAL ASSESSMENT: PAIN_FUNCTIONAL_ASSESSMENT: 0-10

## 2024-07-29 ASSESSMENT — PAIN DESCRIPTION - DESCRIPTORS: DESCRIPTORS: THROBBING

## 2024-07-29 ASSESSMENT — PAIN SCALES - GENERAL: PAINLEVEL_OUTOF10: 7

## 2024-07-29 NOTE — ED TRIAGE NOTES
Pt presents to ED with left testicle pain. Pt notes he was playing in a 3 on 3 basketball tournament yesterday and he went up for a rebound and was elbowed in the testicles. Pt notes the pain was too bad to continue playing, he went home and it has not subsided since. Rates pain 8/10, states its a throbbing, worsened when trying to walk. Denies any previous injuries. Denies nausea and vomiting, as well as abdominal pain, states he is urinating normally.

## 2024-07-29 NOTE — DISCHARGE INSTRUCTIONS
NO SERIOUS INJURIES WERE FOUND OF YOUR LEFT TESTICLE.    APPLY ICE TO THE AFFECTED AREA. TAKE OVER THE COUNTER IBUPROFEN (600MG) EVERY 6-8 HOURS FOR BREAKTHROUGH PAIN RELIEF.

## 2024-07-29 NOTE — ED PROVIDER NOTES
EMERGENCY DEPARTMENT ENCOUNTER     CHIEF COMPLAINT   Chief Complaint   Patient presents with    Testicle Injury        HPI   Bridgetjustinnate Hathaway is a 28 y.o. male previously healthy, who presents with ongoing left scrotal pain, onset was yesterday, when he got \"elbowed\" in his scrotum while playing basketball. The duration has been constant since the onset. The patient has no associated hematuria or back pain.  There are no alleviating factors.  The context is that the symptoms started spontaneously, without any known precipitants.    REVIEW OF SYSTEMS   GI: no nausea, vomiting or diarrhea  General: No fevers  See HPI for further details.   All other review of systems  are reviewed and are otherwise negative.     PAST MEDICAL & SURGICAL HISTORY   History reviewed. No pertinent past medical history.  History reviewed. No pertinent surgical history.     CURRENT MEDICATIONS   Current Outpatient Rx   Medication Sig Dispense Refill    ondansetron (ZOFRAN-ODT) 4 MG disintegrating tablet Take 1 tablet by mouth 3 times daily as needed for Nausea or Vomiting (Patient not taking: Reported on 7/29/2024) 21 tablet 0    fluticasone (FLONASE) 50 MCG/ACT nasal spray 1 spray by Each Nostril route in the morning. (Patient not taking: Reported on 7/29/2024) 16 g 0    dicyclomine (BENTYL) 10 MG capsule Take 1 capsule by mouth 4 times daily (before meals and nightly) for 7 days 28 capsule 0    ondansetron (ZOFRAN-ODT) 4 MG disintegrating tablet Take 1 tablet by mouth 3 times daily as needed for Nausea or Vomiting (Patient not taking: Reported on 7/29/2024) 21 tablet 0        ALLERGIES   Allergies   Allergen Reactions    Sulfa Antibiotics Hives    Medical Provider Ez Flu Shot [Influenza Vac Split Quad]         SOCIAL AND FAMILY HISTORY   Social History     Socioeconomic History    Marital status: Single     Spouse name: None    Number of children: None    Years of education: None    Highest education level: None     History

## 2025-01-05 ENCOUNTER — HOSPITAL ENCOUNTER (EMERGENCY)
Age: 30
Discharge: HOME | End: 2025-01-05
Payer: MEDICARE

## 2025-01-05 VITALS
BODY MASS INDEX: 37.12 KG/M2 | TEMPERATURE: 98 F | WEIGHT: 230 LBS | HEART RATE: 79 BPM | OXYGEN SATURATION: 98 % | DIASTOLIC BLOOD PRESSURE: 97 MMHG | RESPIRATION RATE: 16 BRPM | SYSTOLIC BLOOD PRESSURE: 139 MMHG

## 2025-01-05 DIAGNOSIS — H66.001 NON-RECURRENT ACUTE SUPPURATIVE OTITIS MEDIA OF RIGHT EAR WITHOUT SPONTANEOUS RUPTURE OF TYMPANIC MEMBRANE: ICD-10-CM

## 2025-01-05 DIAGNOSIS — H61.22 IMPACTED CERUMEN OF LEFT EAR: Primary | ICD-10-CM

## 2025-01-05 PROCEDURE — 99283 EMERGENCY DEPT VISIT LOW MDM: CPT

## 2025-01-05 RX ORDER — AMOXICILLIN 500 MG/1
500 CAPSULE ORAL 2 TIMES DAILY
Qty: 14 CAPSULE | Refills: 0 | Status: SHIPPED | OUTPATIENT
Start: 2025-01-05 | End: 2025-01-12

## 2025-01-05 ASSESSMENT — PAIN SCALES - GENERAL: PAINLEVEL_OUTOF10: 4

## 2025-01-05 ASSESSMENT — PAIN DESCRIPTION - LOCATION: LOCATION: EAR

## 2025-01-05 ASSESSMENT — PAIN - FUNCTIONAL ASSESSMENT: PAIN_FUNCTIONAL_ASSESSMENT: 0-10

## 2025-01-05 ASSESSMENT — PAIN DESCRIPTION - ORIENTATION: ORIENTATION: RIGHT

## 2025-01-05 NOTE — ED PROVIDER NOTES
[facility] EMERGENCY DEPT      EMERGENCY MEDICINE     Pt Name: Wanda Hathaway  MRN: [id number]  Birthdate 1995  Date of evaluation: 1/5/2025  Provider: JESSICA Rangel NP    CHIEF COMPLAINT       Chief Complaint   Patient presents with    Ear Pain     R     HISTORY OF PRESENT ILLNESS   Wanda Hathwaay is a pleasant 29 y.o. male who presents to the emergency department for right ear pain that started on Friday.  Patient reports is a burning sensation and feeling full.  Patient denies any fever, nasal congestion sore throat, or cough associated with the symptoms.  Patient reports he has frequent ear infections.  PASTMEDICAL HISTORY   No past medical history on file.    There is no problem list on file for this patient.    SURGICAL HISTORY     No past surgical history on file.    CURRENT MEDICATIONS       Previous Medications    DICYCLOMINE (BENTYL) 10 MG CAPSULE    Take 1 capsule by mouth 4 times daily (before meals and nightly) for 7 days    FLUTICASONE (FLONASE) 50 MCG/ACT NASAL SPRAY    1 spray by Each Nostril route in the morning.    ONDANSETRON (ZOFRAN-ODT) 4 MG DISINTEGRATING TABLET    Take 1 tablet by mouth 3 times daily as needed for Nausea or Vomiting    ONDANSETRON (ZOFRAN-ODT) 4 MG DISINTEGRATING TABLET    Take 1 tablet by mouth 3 times daily as needed for Nausea or Vomiting       ALLERGIES     is allergic to sulfa antibiotics and medical provider ez flu shot [influenza vac split quad].    FAMILY HISTORY     has no family status information on file.        SOCIAL HISTORY          PHYSICAL EXAM       ED Triage Vitals   BP Systolic BP Percentile Diastolic BP Percentile Temp Temp Source Pulse Respirations SpO2   01/05/25 1726 -- -- 01/05/25 1725 01/05/25 1725 01/05/25 1725 01/05/25 1725 01/05/25 1725   (!) 139/97   98 °F (36.7 °C) Oral 79 16 98 %      Height Weight - Scale         -- 01/05/25 1725          104.3 kg (230 lb)             Additional Vital Signs:  Vitals:    
4 = No assist / stand by assistance

## 2025-01-05 NOTE — DISCHARGE INSTRUCTIONS
Schedule an appointment primary care physician for ear irrigation.  Take antibiotic as prescribed.  May continue the BC powder if needed for pain.  May also try Tylenol or ibuprofen as needed for pain.
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
